# Patient Record
Sex: FEMALE | Race: BLACK OR AFRICAN AMERICAN | Employment: PART TIME | URBAN - METROPOLITAN AREA
[De-identification: names, ages, dates, MRNs, and addresses within clinical notes are randomized per-mention and may not be internally consistent; named-entity substitution may affect disease eponyms.]

---

## 2017-01-04 DIAGNOSIS — R51.9 ACUTE NONINTRACTABLE HEADACHE, UNSPECIFIED HEADACHE TYPE: ICD-10-CM

## 2017-01-04 DIAGNOSIS — L30.9 DERMATITIS: ICD-10-CM

## 2017-01-04 RX ORDER — LORATADINE 10 MG/1
10 TABLET ORAL DAILY
Qty: 30 TABLET | Refills: 1 | Status: SHIPPED | OUTPATIENT
Start: 2017-01-04 | End: 2017-05-18 | Stop reason: ALTCHOICE

## 2017-01-04 RX ORDER — NAPROXEN 500 MG/1
500 TABLET ORAL 2 TIMES DAILY PRN
Qty: 60 TABLET | Refills: 0 | Status: SHIPPED | OUTPATIENT
Start: 2017-01-04 | End: 2017-05-18 | Stop reason: ALTCHOICE

## 2017-04-06 ENCOUNTER — TELEPHONE (OUTPATIENT)
Dept: INTERNAL MEDICINE CLINIC | Age: 22
End: 2017-04-06

## 2017-05-18 ENCOUNTER — OFFICE VISIT (OUTPATIENT)
Dept: PRIMARY CARE CLINIC | Age: 22
End: 2017-05-18

## 2017-05-18 VITALS
TEMPERATURE: 98.7 F | HEART RATE: 97 BPM | DIASTOLIC BLOOD PRESSURE: 78 MMHG | HEIGHT: 63 IN | WEIGHT: 167.6 LBS | BODY MASS INDEX: 29.7 KG/M2 | SYSTOLIC BLOOD PRESSURE: 108 MMHG | OXYGEN SATURATION: 98 %

## 2017-05-18 DIAGNOSIS — Z22.7 TB LUNG, LATENT: ICD-10-CM

## 2017-05-18 DIAGNOSIS — J30.2 SEASONAL ALLERGIC RHINITIS, UNSPECIFIED ALLERGIC RHINITIS TRIGGER: ICD-10-CM

## 2017-05-18 DIAGNOSIS — N92.6 IRREGULAR PERIODS: ICD-10-CM

## 2017-05-18 DIAGNOSIS — Z00.00 PREVENTATIVE HEALTH CARE: ICD-10-CM

## 2017-05-18 DIAGNOSIS — Z00.00 PREVENTATIVE HEALTH CARE: Primary | ICD-10-CM

## 2017-05-18 LAB
ALBUMIN SERPL-MCNC: 4.3 G/DL (ref 3.4–5)
ALP BLD-CCNC: 57 U/L (ref 40–129)
ALT SERPL-CCNC: 12 U/L (ref 10–40)
ANION GAP SERPL CALCULATED.3IONS-SCNC: 16 MMOL/L (ref 3–16)
AST SERPL-CCNC: 14 U/L (ref 15–37)
BASOPHILS ABSOLUTE: 0 K/UL (ref 0–0.2)
BASOPHILS RELATIVE PERCENT: 0.4 %
BILIRUB SERPL-MCNC: 0.3 MG/DL (ref 0–1)
BILIRUBIN DIRECT: <0.2 MG/DL (ref 0–0.3)
BILIRUBIN, INDIRECT: ABNORMAL MG/DL (ref 0–1)
BUN BLDV-MCNC: 7 MG/DL (ref 7–20)
CALCIUM SERPL-MCNC: 9.1 MG/DL (ref 8.3–10.6)
CHLORIDE BLD-SCNC: 102 MMOL/L (ref 99–110)
CO2: 22 MMOL/L (ref 21–32)
CREAT SERPL-MCNC: 0.5 MG/DL (ref 0.6–1.1)
EOSINOPHILS ABSOLUTE: 0.1 K/UL (ref 0–0.6)
EOSINOPHILS RELATIVE PERCENT: 2.3 %
GFR AFRICAN AMERICAN: >60
GFR NON-AFRICAN AMERICAN: >60
GLUCOSE BLD-MCNC: 85 MG/DL (ref 70–99)
HCT VFR BLD CALC: 38 % (ref 36–48)
HEMOGLOBIN: 12.8 G/DL (ref 12–16)
HEPATITIS C ANTIBODY INTERPRETATION: NORMAL
LYMPHOCYTES ABSOLUTE: 1.7 K/UL (ref 1–5.1)
LYMPHOCYTES RELATIVE PERCENT: 30.4 %
MCH RBC QN AUTO: 29 PG (ref 26–34)
MCHC RBC AUTO-ENTMCNC: 33.7 G/DL (ref 31–36)
MCV RBC AUTO: 86 FL (ref 80–100)
MONOCYTES ABSOLUTE: 0.5 K/UL (ref 0–1.3)
MONOCYTES RELATIVE PERCENT: 9.5 %
NEUTROPHILS ABSOLUTE: 3.3 K/UL (ref 1.7–7.7)
NEUTROPHILS RELATIVE PERCENT: 57.4 %
PDW BLD-RTO: 12.6 % (ref 12.4–15.4)
PHOSPHORUS: 3.4 MG/DL (ref 2.5–4.9)
PLATELET # BLD: 223 K/UL (ref 135–450)
PMV BLD AUTO: 9.7 FL (ref 5–10.5)
POTASSIUM SERPL-SCNC: 4.2 MMOL/L (ref 3.5–5.1)
RBC # BLD: 4.42 M/UL (ref 4–5.2)
SODIUM BLD-SCNC: 140 MMOL/L (ref 136–145)
TOTAL PROTEIN: 6.8 G/DL (ref 6.4–8.2)
WBC # BLD: 5.7 K/UL (ref 4–11)

## 2017-05-18 PROCEDURE — 99395 PREV VISIT EST AGE 18-39: CPT | Performed by: INTERNAL MEDICINE

## 2017-05-18 RX ORDER — CETIRIZINE HYDROCHLORIDE 5 MG/1
5 TABLET ORAL DAILY
Qty: 30 TABLET | Refills: 3 | Status: SHIPPED | OUTPATIENT
Start: 2017-05-18 | End: 2017-06-30 | Stop reason: ALTCHOICE

## 2017-05-18 RX ORDER — FLUTICASONE PROPIONATE 50 MCG
1 SPRAY, SUSPENSION (ML) NASAL DAILY
Qty: 1 BOTTLE | Refills: 3 | Status: SHIPPED | OUTPATIENT
Start: 2017-05-18 | End: 2019-04-18 | Stop reason: SDUPTHER

## 2017-05-18 ASSESSMENT — ENCOUNTER SYMPTOMS
DIARRHEA: 0
NAUSEA: 1
SHORTNESS OF BREATH: 0
RHINORRHEA: 0
SORE THROAT: 0
VOMITING: 0
COUGH: 0
ABDOMINAL PAIN: 0

## 2017-05-19 LAB
ESTIMATED AVERAGE GLUCOSE: 105.4 MG/DL
HBA1C MFR BLD: 5.3 %

## 2017-05-20 LAB — HIV-1 AND HIV-2 ANTIBODIES: NEGATIVE

## 2017-05-26 ENCOUNTER — TELEPHONE (OUTPATIENT)
Dept: PRIMARY CARE CLINIC | Age: 22
End: 2017-05-26

## 2017-05-31 DIAGNOSIS — G56.01 RIGHT CARPAL TUNNEL SYNDROME: Primary | ICD-10-CM

## 2017-06-01 ENCOUNTER — OFFICE VISIT (OUTPATIENT)
Dept: ORTHOPEDIC SURGERY | Age: 22
End: 2017-06-01

## 2017-06-01 VITALS
BODY MASS INDEX: 28.51 KG/M2 | DIASTOLIC BLOOD PRESSURE: 63 MMHG | WEIGHT: 167 LBS | HEART RATE: 67 BPM | SYSTOLIC BLOOD PRESSURE: 103 MMHG | HEIGHT: 64 IN

## 2017-06-01 DIAGNOSIS — G56.03 CARPAL TUNNEL SYNDROME, BILATERAL: Primary | ICD-10-CM

## 2017-06-01 PROCEDURE — 99203 OFFICE O/P NEW LOW 30 MIN: CPT | Performed by: ORTHOPAEDIC SURGERY

## 2017-06-08 ENCOUNTER — TELEPHONE (OUTPATIENT)
Dept: PRIMARY CARE CLINIC | Age: 22
End: 2017-06-08

## 2017-06-29 ENCOUNTER — TELEPHONE (OUTPATIENT)
Dept: PRIMARY CARE CLINIC | Age: 22
End: 2017-06-29

## 2017-06-30 ENCOUNTER — OFFICE VISIT (OUTPATIENT)
Dept: PRIMARY CARE CLINIC | Age: 22
End: 2017-06-30

## 2017-06-30 VITALS
BODY MASS INDEX: 27.66 KG/M2 | HEIGHT: 64 IN | TEMPERATURE: 97.8 F | RESPIRATION RATE: 22 BRPM | OXYGEN SATURATION: 97 % | WEIGHT: 162 LBS | DIASTOLIC BLOOD PRESSURE: 67 MMHG | SYSTOLIC BLOOD PRESSURE: 104 MMHG | HEART RATE: 101 BPM

## 2017-06-30 DIAGNOSIS — J01.01 ACUTE RECURRENT MAXILLARY SINUSITIS: Primary | ICD-10-CM

## 2017-06-30 DIAGNOSIS — J30.1 SEASONAL ALLERGIC RHINITIS DUE TO POLLEN: ICD-10-CM

## 2017-06-30 PROCEDURE — 99213 OFFICE O/P EST LOW 20 MIN: CPT | Performed by: FAMILY MEDICINE

## 2017-06-30 RX ORDER — ACETAMINOPHEN 325 MG/1
650 TABLET ORAL EVERY 4 HOURS PRN
Qty: 120 TABLET | Refills: 3 | Status: SHIPPED | OUTPATIENT
Start: 2017-06-30 | End: 2019-04-30 | Stop reason: SDUPTHER

## 2017-06-30 RX ORDER — LORATADINE 10 MG/1
1 TABLET ORAL DAILY
COMMUNITY
Start: 2017-06-22 | End: 2019-04-18 | Stop reason: SDUPTHER

## 2017-06-30 RX ORDER — GUAIFENESIN 600 MG/1
600 TABLET, EXTENDED RELEASE ORAL 2 TIMES DAILY
Qty: 60 TABLET | Refills: 0 | Status: SHIPPED | OUTPATIENT
Start: 2017-06-30 | End: 2017-08-28

## 2017-06-30 RX ORDER — AZITHROMYCIN 250 MG/1
TABLET, FILM COATED ORAL
Qty: 1 PACKET | Refills: 0 | Status: SHIPPED | OUTPATIENT
Start: 2017-06-30 | End: 2017-07-10

## 2017-07-26 ENCOUNTER — OFFICE VISIT (OUTPATIENT)
Dept: PRIMARY CARE CLINIC | Age: 22
End: 2017-07-26

## 2017-07-26 VITALS
HEART RATE: 92 BPM | BODY MASS INDEX: 29.02 KG/M2 | OXYGEN SATURATION: 100 % | HEIGHT: 64 IN | RESPIRATION RATE: 18 BRPM | SYSTOLIC BLOOD PRESSURE: 129 MMHG | DIASTOLIC BLOOD PRESSURE: 75 MMHG | WEIGHT: 170 LBS | TEMPERATURE: 99.3 F

## 2017-07-26 DIAGNOSIS — R11.0 NAUSEA: ICD-10-CM

## 2017-07-26 DIAGNOSIS — N92.6 IRREGULAR PERIODS: ICD-10-CM

## 2017-07-26 DIAGNOSIS — R35.0 FREQUENCY OF URINATION: Primary | ICD-10-CM

## 2017-07-26 LAB
BILIRUBIN URINE: NEGATIVE
BILIRUBIN, POC: NORMAL
BLOOD URINE, POC: NORMAL
BLOOD, URINE: NEGATIVE
CLARITY, POC: NORMAL
CLARITY: CLEAR
COLOR, POC: YELLOW
COLOR: YELLOW
CONTROL: NORMAL
GLUCOSE BLD-MCNC: 82 MG/DL
GLUCOSE URINE, POC: NORMAL
GLUCOSE URINE: NEGATIVE MG/DL
KETONES, POC: NORMAL
KETONES, URINE: NEGATIVE MG/DL
LEUKOCYTE EST, POC: NORMAL
LEUKOCYTE ESTERASE, URINE: NEGATIVE
MICROSCOPIC EXAMINATION: NORMAL
NITRITE, POC: NORMAL
NITRITE, URINE: NEGATIVE
PH UA: 7
PH, POC: 7
PREGNANCY TEST URINE, POC: NORMAL
PROTEIN UA: NEGATIVE MG/DL
PROTEIN, POC: NORMAL
SPECIFIC GRAVITY UA: 1.01
SPECIFIC GRAVITY, POC: 1.01
URINE TYPE: NORMAL
UROBILINOGEN, POC: 0.2
UROBILINOGEN, URINE: 0.2 E.U./DL

## 2017-07-26 PROCEDURE — 81025 URINE PREGNANCY TEST: CPT | Performed by: FAMILY MEDICINE

## 2017-07-26 PROCEDURE — 81002 URINALYSIS NONAUTO W/O SCOPE: CPT | Performed by: FAMILY MEDICINE

## 2017-07-26 PROCEDURE — 82962 GLUCOSE BLOOD TEST: CPT | Performed by: FAMILY MEDICINE

## 2017-07-26 PROCEDURE — 99214 OFFICE O/P EST MOD 30 MIN: CPT | Performed by: FAMILY MEDICINE

## 2017-07-26 RX ORDER — CIPROFLOXACIN 500 MG/1
TABLET, FILM COATED ORAL
Qty: 6 TABLET | Refills: 0 | Status: SHIPPED | OUTPATIENT
Start: 2017-07-26 | End: 2017-08-28

## 2017-07-26 ASSESSMENT — ENCOUNTER SYMPTOMS
DIARRHEA: 0
TROUBLE SWALLOWING: 0
PHOTOPHOBIA: 0
RHINORRHEA: 0
EYE DISCHARGE: 0
EYE ITCHING: 0
CONSTIPATION: 0
SORE THROAT: 0
VOMITING: 0
NAUSEA: 0
RECTAL PAIN: 0
APNEA: 0
COUGH: 0
BACK PAIN: 0
SINUS PRESSURE: 0
STRIDOR: 0
ABDOMINAL DISTENTION: 0
BLOOD IN STOOL: 0
EYE PAIN: 0
EYE REDNESS: 0
CHEST TIGHTNESS: 0
SHORTNESS OF BREATH: 0
WHEEZING: 0
CHOKING: 0
COLOR CHANGE: 0

## 2017-07-28 ENCOUNTER — TELEPHONE (OUTPATIENT)
Dept: PRIMARY CARE CLINIC | Age: 22
End: 2017-07-28

## 2017-08-16 ENCOUNTER — HOSPITAL ENCOUNTER (OUTPATIENT)
Dept: NEUROLOGY | Age: 22
Discharge: HOME | End: 2017-08-16
Attending: ORTHOPAEDIC SURGERY | Admitting: ORTHOPAEDIC SURGERY

## 2017-08-16 DIAGNOSIS — G56.03 BILATERAL CARPAL TUNNEL SYNDROME: ICD-10-CM

## 2017-08-18 ENCOUNTER — TELEPHONE (OUTPATIENT)
Dept: ORTHOPEDIC SURGERY | Age: 22
End: 2017-08-18

## 2017-08-23 ENCOUNTER — TELEPHONE (OUTPATIENT)
Dept: INTERNAL MEDICINE CLINIC | Age: 22
End: 2017-08-23

## 2017-08-28 ENCOUNTER — OFFICE VISIT (OUTPATIENT)
Dept: PRIMARY CARE CLINIC | Age: 22
End: 2017-08-28

## 2017-08-28 VITALS
SYSTOLIC BLOOD PRESSURE: 114 MMHG | BODY MASS INDEX: 28.51 KG/M2 | RESPIRATION RATE: 18 BRPM | TEMPERATURE: 98.2 F | HEIGHT: 64 IN | HEART RATE: 92 BPM | OXYGEN SATURATION: 100 % | WEIGHT: 167 LBS | DIASTOLIC BLOOD PRESSURE: 67 MMHG

## 2017-08-28 DIAGNOSIS — Z22.7 TB LUNG, LATENT: ICD-10-CM

## 2017-08-28 DIAGNOSIS — Z00.00 PREVENTATIVE HEALTH CARE: ICD-10-CM

## 2017-08-28 DIAGNOSIS — Z00.00 PREVENTATIVE HEALTH CARE: Primary | ICD-10-CM

## 2017-08-28 LAB
ALBUMIN SERPL-MCNC: 4.8 G/DL (ref 3.4–5)
ANION GAP SERPL CALCULATED.3IONS-SCNC: 12 MMOL/L (ref 3–16)
BASOPHILS ABSOLUTE: 0 K/UL (ref 0–0.2)
BASOPHILS RELATIVE PERCENT: 0.4 %
BUN BLDV-MCNC: 11 MG/DL (ref 7–20)
CALCIUM SERPL-MCNC: 10 MG/DL (ref 8.3–10.6)
CHLORIDE BLD-SCNC: 105 MMOL/L (ref 99–110)
CO2: 25 MMOL/L (ref 21–32)
CREAT SERPL-MCNC: 0.5 MG/DL (ref 0.6–1.1)
EOSINOPHILS ABSOLUTE: 0.1 K/UL (ref 0–0.6)
EOSINOPHILS RELATIVE PERCENT: 1.9 %
GFR AFRICAN AMERICAN: >60
GFR NON-AFRICAN AMERICAN: >60
GLUCOSE BLD-MCNC: 77 MG/DL (ref 70–99)
HCT VFR BLD CALC: 39.7 % (ref 36–48)
HEMOGLOBIN: 13.3 G/DL (ref 12–16)
LYMPHOCYTES ABSOLUTE: 1.5 K/UL (ref 1–5.1)
LYMPHOCYTES RELATIVE PERCENT: 27.5 %
MCH RBC QN AUTO: 28.5 PG (ref 26–34)
MCHC RBC AUTO-ENTMCNC: 33.5 G/DL (ref 31–36)
MCV RBC AUTO: 84.9 FL (ref 80–100)
MONOCYTES ABSOLUTE: 0.6 K/UL (ref 0–1.3)
MONOCYTES RELATIVE PERCENT: 11.2 %
NEUTROPHILS ABSOLUTE: 3.3 K/UL (ref 1.7–7.7)
NEUTROPHILS RELATIVE PERCENT: 59 %
PDW BLD-RTO: 13.4 % (ref 12.4–15.4)
PHOSPHORUS: 3.3 MG/DL (ref 2.5–4.9)
PLATELET # BLD: 231 K/UL (ref 135–450)
PMV BLD AUTO: 9.7 FL (ref 5–10.5)
POTASSIUM SERPL-SCNC: 4.2 MMOL/L (ref 3.5–5.1)
RBC # BLD: 4.67 M/UL (ref 4–5.2)
SODIUM BLD-SCNC: 142 MMOL/L (ref 136–145)
WBC # BLD: 5.6 K/UL (ref 4–11)

## 2017-08-28 PROCEDURE — 99395 PREV VISIT EST AGE 18-39: CPT | Performed by: INTERNAL MEDICINE

## 2017-08-28 ASSESSMENT — ENCOUNTER SYMPTOMS
COUGH: 0
ABDOMINAL PAIN: 0
DIARRHEA: 0
SHORTNESS OF BREATH: 0
VOMITING: 0
NAUSEA: 0

## 2017-08-28 ASSESSMENT — PATIENT HEALTH QUESTIONNAIRE - PHQ9
SUM OF ALL RESPONSES TO PHQ QUESTIONS 1-9: 0
SUM OF ALL RESPONSES TO PHQ9 QUESTIONS 1 & 2: 0
2. FEELING DOWN, DEPRESSED OR HOPELESS: 0

## 2017-08-30 ENCOUNTER — TELEPHONE (OUTPATIENT)
Dept: PRIMARY CARE CLINIC | Age: 22
End: 2017-08-30

## 2017-08-31 LAB
QUANTIFERON (R) TB GOLD (INCUBATED): NEGATIVE
QUANTIFERON MITOGEN: >10 IU/ML
QUANTIFERON NIL: 0.24 IU/ML
QUANTIFERON TB AG MINUS NIL: 0 IU/ML (ref 0–0.34)

## 2017-09-11 ENCOUNTER — TELEPHONE (OUTPATIENT)
Dept: PRIMARY CARE CLINIC | Age: 22
End: 2017-09-11

## 2017-09-25 ENCOUNTER — TELEPHONE (OUTPATIENT)
Dept: ORTHOPEDIC SURGERY | Age: 22
End: 2017-09-25

## 2017-10-06 ENCOUNTER — OFFICE VISIT (OUTPATIENT)
Dept: PRIMARY CARE CLINIC | Age: 22
End: 2017-10-06

## 2017-10-06 VITALS
HEART RATE: 86 BPM | BODY MASS INDEX: 30.16 KG/M2 | WEIGHT: 173 LBS | SYSTOLIC BLOOD PRESSURE: 111 MMHG | DIASTOLIC BLOOD PRESSURE: 64 MMHG

## 2017-10-06 DIAGNOSIS — R22.9: ICD-10-CM
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
## 2017-10-06 DIAGNOSIS — L29.9 ITCHING: Primary | ICD-10-CM

## 2017-10-06 PROBLEM — S80.869A FLEA BITE OF LOWER LEG: Status: ACTIVE | Noted: 2017-10-06

## 2017-10-06 PROCEDURE — 99213 OFFICE O/P EST LOW 20 MIN: CPT | Performed by: INTERNAL MEDICINE

## 2017-10-06 RX ORDER — HYDROXYZINE HYDROCHLORIDE 25 MG/1
25 TABLET, FILM COATED ORAL 3 TIMES DAILY PRN
Qty: 30 TABLET | Refills: 1 | Status: SHIPPED | OUTPATIENT
Start: 2017-10-06 | End: 2017-10-16

## 2017-10-06 RX ORDER — ASPIRIN 81 MG/1
81 TABLET, CHEWABLE ORAL
COMMUNITY
Start: 2017-09-14 | End: 2019-04-30 | Stop reason: ALTCHOICE

## 2017-10-06 RX ORDER — PRAMOXINE HYDROCHLORIDE 10 MG/ML
1 LOTION TOPICAL 3 TIMES DAILY PRN
Qty: 222 ML | Refills: 1 | Status: SHIPPED | OUTPATIENT
Start: 2017-10-06 | End: 2019-04-30 | Stop reason: ALTCHOICE

## 2017-10-06 ASSESSMENT — ENCOUNTER SYMPTOMS
RESPIRATORY NEGATIVE: 1
GASTROINTESTINAL NEGATIVE: 1
SWOLLEN GLANDS: 0
NAUSEA: 0
EYES NEGATIVE: 1
SORE THROAT: 0
VISUAL CHANGE: 0
CHANGE IN BOWEL HABIT: 0
VOMITING: 0
COUGH: 0
ABDOMINAL PAIN: 0

## 2017-10-06 NOTE — PROGRESS NOTES
Carmen Lemos  YOB: 1995    Date of Service:  10/6/2017    Chief Complaint   Patient presents with    Other     rash on ankles for 4-5 days, itching. possibly flee bites    Health Maintenance     flu vacccine at [facility]      Flea bits on lower legs with constant itching for 5 days. Other   Chronicity: flea bites. The current episode started in the past 7 days. The problem occurs constantly. The problem has been unchanged. Pertinent negatives include no abdominal pain, anorexia, arthralgias, change in bowel habit, chest pain, chills, congestion, coughing, diaphoresis, fatigue, fever, headaches, joint swelling, myalgias, nausea, neck pain, numbness, rash, sore throat, swollen glands, urinary symptoms, visual change, vomiting or weakness. Nothing aggravates the symptoms. She has tried nothing for the symptoms. The treatment provided no relief.        Allergies   Allergen Reactions    Nickel      Topical       Outpatient Prescriptions Marked as Taking for the 10/6/17 encounter (Office Visit) with Keaton Dupont MD   Medication Sig Dispense Refill    aspirin 81 MG chewable tablet Take 81 mg by mouth      IRON PO Take 1 tablet by mouth      Pramoxine HCl 1 % LOTN Apply 1 actuation topically 3 times daily as needed (itching from fleas) 222 mL 1    hydrOXYzine (ATARAX) 25 MG tablet Take 1 tablet by mouth 3 times daily as needed for Itching 30 tablet 1    acetaminophen (AMINOFEN) 325 MG tablet Take 2 tablets by mouth every 4 hours as needed for Pain 120 tablet 3       Immunization History   Administered Date(s) Administered    DTaP [birth year], 02/12/1996    HPV Gardasil Quadrivalent 01/21/2011, 03/21/2011, 12/19/2011, 02/20/2012    Hepatitis B, unspecified formulation 1995    Hib, unspecified foumulation 1995, 1995, 02/12/1996    IPV (Ipol) 1995, 1995    Influenza Virus Vaccine 09/20/2011, 09/25/2013, 10/30/2014, 10/05/2015, 09/14/2017    Influenza, Quadv, 3 yrs and older, IM, Preservative Free 2016    MMR 1996    Meningococcal MCV4P (Menactra) 2012    PPD Test 2015, 2015, 10/03/2016    Tdap (Boostrix, Adacel) 2012, 2015    Varicella (Varivax) 2008       Past Medical History:   Diagnosis Date    Counseling on other sexually transmitted diseases 2012    Elevated LFTs 13    viral negative    Mid-back pain, acute 2014    Otitis media, chronic, bilateral 2012    PIH (pregnancy induced hypertension) 2014    Positive TB test     negative chest XR    Preeclampsia 2014    post partum     Past Surgical History:   Procedure Laterality Date    ADENOIDECTOMY       SECTION      DENTAL SURGERY      root canal    TONSILLECTOMY       Family History   Problem Relation Age of Onset    High Cholesterol Mother     Depression Mother     Arthritis Mother     Asthma Sister     Early Death Sister     Heart Disease Sister     Depression Maternal Aunt     Arthritis Maternal Grandmother     High Blood Pressure Maternal Grandmother     Kidney Disease Maternal Grandmother     High Blood Pressure Maternal Grandfather     Vision Loss Maternal Grandfather     Rheum Arthritis Neg Hx     Osteoarthritis Neg Hx     Breast Cancer Neg Hx     Cancer Neg Hx     Diabetes Neg Hx     Heart Failure Neg Hx     Hypertension Neg Hx     Migraines Neg Hx     Ovarian Cancer Neg Hx     Rashes/Skin Problems Neg Hx     Seizures Neg Hx     Stroke Neg Hx     Thyroid Disease Neg Hx     Other Neg Hx      VTE       Review of Systems:  Review of Systems   Constitutional: Negative. Negative for chills, diaphoresis, fatigue and fever. HENT: Negative. Negative for congestion and sore throat. Eyes: Negative. Respiratory: Negative. Negative for cough. Cardiovascular: Negative. Negative for chest pain. Gastrointestinal: Negative.   Negative for abdominal pain, anorexia, 05/25/2017 see below     WBC, UA 05/25/2017 99*    RBC, UA 05/25/2017 8*    Epi Cells 05/25/2017 2    Orders Only on 05/18/2017   Component Date Value    WBC 05/18/2017 5.7     RBC 05/18/2017 4.42     Hemoglobin 05/18/2017 12.8     Hematocrit 05/18/2017 38.0     MCV 05/18/2017 86.0     MCH 05/18/2017 29.0     MCHC 05/18/2017 33.7     RDW 05/18/2017 12.6     Platelets 83/23/4104 223     MPV 05/18/2017 9.7     Neutrophils % 05/18/2017 57.4     Lymphocytes % 05/18/2017 30.4     Monocytes % 05/18/2017 9.5     Eosinophils % 05/18/2017 2.3     Basophils % 05/18/2017 0.4     Neutrophils # 05/18/2017 3.3     Lymphocytes # 05/18/2017 1.7     Monocytes # 05/18/2017 0.5     Eosinophils # 05/18/2017 0.1     Basophils # 05/18/2017 0.0     Hemoglobin A1C 05/18/2017 5.3     eAG 05/18/2017 105.4     Total Protein 05/18/2017 6.8     Alb 05/18/2017 4.3     Alkaline Phosphatase 05/18/2017 57     ALT 05/18/2017 12     AST 05/18/2017 14*    Total Bilirubin 05/18/2017 0.3     Bilirubin, Direct 05/18/2017 <0.2     Bilirubin, Indirect 05/18/2017 see below     HIV-1/HIV-2 Ab 05/18/2017 Negative     Sodium 05/18/2017 140     Potassium 05/18/2017 4.2     Chloride 05/18/2017 102     CO2 05/18/2017 22     Anion Gap 05/18/2017 16     Glucose 05/18/2017 85     BUN 05/18/2017 7     CREATININE 05/18/2017 0.5*    GFR Non- 05/18/2017 >60     GFR  05/18/2017 >60     Calcium 05/18/2017 9.1     Phosphorus 05/18/2017 3.4     Hep C Ab Interp 05/18/2017 Non-reactive          Health Maintenance   Topic Date Due    Chlamydia screen  02/22/2017    Cervical cancer screen  02/22/2019    DTaP/Tdap/Td vaccine (6 - Td) 01/20/2025    Meningococcal (MCV) Vaccine Age 0-22 Years  Completed    Flu vaccine  Completed    HIV screen  Completed          Assessment/Plan:    Flea bite of lower leg  Flea bites on lower legs with pruritis.       1. Insect bite, initial encounter    - Pramoxine HCl 1 % LOTN; Apply 1 actuation topically 3 times daily as needed (itching from fleas)  Dispense: 222 mL; Refill: 1  - hydrOXYzine (ATARAX) 25 MG tablet; Take 1 tablet by mouth 3 times daily as needed for Itching  Dispense: 30 tablet; Refill: 1       No Follow-up on file.

## 2017-10-06 NOTE — PATIENT INSTRUCTIONS
Use the topical lotion and the Atarax pills as directed for relief of the itching and skin irritation from the flea bites. See Dr. Natalya Werner in 2 weeks. Tick Bite: Care Instructions  Your Care Instructions    Ticks are small spiderlike animals. They bite to fasten themselves onto your skin and feed on your blood. Ticks can carry diseases. But most ticks do not carry diseases, and most tick bites do not cause serious health problems. Some people may have an allergic reaction to a tick bite. This reaction may be mild, with symptoms like itching and swelling. In rare cases, a severe allergic reaction may occur. Most of the time, all you need to do for a tick bite is relieve any symptoms you may have. Follow-up care is a key part of your treatment and safety. Be sure to make and go to all appointments, and call your doctor if you are having problems. It's also a good idea to know your test results and keep a list of the medicines you take. How can you care for yourself at home? · Put ice or a cold pack on the bite for 15 to 20 minutes once an hour. Put a thin cloth between the ice and your skin. · Try an over-the-counter medicine to relieve itching, redness, swelling, and pain. Be safe with medicines. Read and follow all instructions on the label. ¨ Take an antihistamine medicine, such as chlorpheniramine (Chlor-Trimeton) or diphenhydramine (Benadryl). These medicines may help relieve itching, redness, and swelling. ¨ Use a spray of local anesthetic that contains benzocaine, such as Solarcaine. It may help relieve pain. If your skin reacts to the spray, stop using it. ¨ Put calamine lotion on the skin. It may help relieve itching. To avoid tick bites  · Avoid ticks:  ¨ Learn where ticks are found in your community, and stay away from those areas if possible. ¨ Cover as much of your body as possible when you work or play in grassy or wooded areas. ¨ Use insect repellents, such as products containing DEET. infection, such as:  ¨ Increased pain, swelling, warmth, or redness around the bite. ¨ Red streaks leading from the bite. ¨ Pus draining from the bite. ¨ A fever. Watch closely for changes in your health, and be sure to contact your doctor if:  · You develop a new rash. · You have joint pain. · You are very tired. · You have flu-like symptoms. · You have symptoms for more than 1 week. Where can you learn more? Go to https://[employer]. org and sign in to your [employer] account. Enter I188 in the [employer] box to learn more about \"Tick Bite: Care Instructions. \"     If you do not have an account, please click on the \"Sign Up Now\" link. Current as of: March 20, 2017  Content Version: 11.3  © 6789-8189 [employer]. Care instructions adapted under license by [facility] ([facility]). If you have questions about a medical condition or this instruction, always ask your healthcare professional. [place] [address] any warranty or liability for your use of this information. pramoxine topical  Pronunciation:  pra MOX een TOP i aide  Brand:  Anest Hemor, Blistex Pro Relief, Calaclear, Caladryl Clear (obsolete), Callergy Clear, Curasore, Gold Bond Anti-Itch, Itch-X, PrameGel, Pramox, Prax, Prax Wipe, Proctofoam, Proctozone-P, Sarna Sensitive, Sarna Ultra, Summers Мария Anti-Itch, Tronolane  What is the most important information I should know about pramoxine topical?  Use exactly as directed on the label, or as prescribed by your doctor. Do not use in larger or smaller amounts or for longer than recommended. Get emergency medical help if you have any of these signs of an allergic reaction: hives; difficult breathing; swelling of your face, lips, tongue, or throat. Less serious side effects are more likely, and you may have none at all. What is pramoxine topical?  Pramoxine is an anesthetic, or \"numbing medicine. \" It works by interfering with pain signals sent from the nerves to the brain. Pramoxine topical (for the skin) is used to treat pain or itching caused by insect bites, minor burns or scrapes, hemorrhoids, and minor skin rash, dryness, or itching. Pramoxine topical is also used to treat chapped lips, and pain or skin irritation caused by coming into contact with poison ivy, poison oak, or poison sumac. Pramoxine topical may also be used for purposes not listed in this medication guide. What should I discuss with my health care provider before using pramoxine topical?  You should not use this medication if you are allergic to pramoxine. Ask a doctor or pharmacist if it is safe for you to take this medicine if you are allergic to any drugs or any other numbing medicines. FDA pregnancy category C. It is not known whether pramoxine topical will harm an unborn baby. Do not use this medication without medical advice if you are pregnant. It is not known whether pramoxine topical passes into breast milk or if it could harm a nursing baby. Do not use this medication without medical advice if you are breast-feeding a baby. How should I use pramoxine topical?  Use exactly as directed on the label, or as prescribed by your doctor. Do not use in larger or smaller amounts or for longer than recommended. Pramoxine is usually applied to the affected area 3 to 5 times daily, depending on which form of this medication you use. Follow the label directions or your doctor's instructions about how much medication to use and how often. Pramoxine hemorrhoid cream, lotion, foam, or medicated wipe may be used on the rectum after each bowel movement to treat hemorrhoid pain and itching. Wash your hands before and after applying pramoxine topical. Wash the affected skin area with warm water and a mild soap. Rinse and dry the area thoroughly. To use pramoxine on the skin, (spray, lotion, gel, or stick), apply just enough of the medication to cover the area to be treated.   To use the pramoxine medicated wipe to treat the hemorrhoid area, apply the medication by patting the wipe onto the rectal area. Avoid harsh rubbing. You may fold the wipe and leave it in place for up to 15 minutes. Each pramoxine medicated wipe is for one use only. Throw the wipe away after using. Shake the pramoxine rectal foam before each use. Squirt only a small amount of the medicine onto a clean tissue and apply it to your rectum. Do not insert this medication or the medicated wipe into your rectum. Use pramoxine topical only on the outside of the area. Stop using pramoxine and call your doctor if your symptoms do not improve after 7 days of treatment, or if your condition clears up and then comes back. Store at room temperature away from moisture and heat. What happens if I miss a dose? Since pramoxine topical is used on an as needed basis, you are not likely to miss a dose. What happens if I overdose? Seek emergency medical attention or call the Poison Help line at 1-809.730.3283. What should I avoid while using pramoxine topical?  Avoid getting this medication in your eyes or nose. If this does happen, rinse with water. Do not use pramoxine topical on deep skin wounds, blistered skin, severe burns, or large skin areas. Seek medical attention for more severe skin irritation or injury. Avoid using other medications on the areas you treat with pramoxine topical unless your doctor tells you to. What are the possible side effects of pramoxine topical?  Get emergency medical help if you have any of these signs of an allergic reaction:  hives; difficulty breathing; swelling of your face, lips, tongue, or throat. Stop using pramoxine topical and call your doctor at once if you have a serious side effect such as:  · any new redness or swelling where the medicine was applied; or  · severe pain, burning, or stinging where the medicine is applied.   Less serious side effects are more likely, and you may have none at

## 2017-10-06 NOTE — MR AVS SNAPSHOT
After Visit Summary             Modesta Wright   10/6/2017 9:45 AM   Office Visit    Description:  Female : 1995   Provider:  Dalia Belcher MD   Department:  [address] [facility] [facility]              Your Follow-Up and Future Appointments         Below is a list of your follow-up and future appointments. This may not be a complete list as you may have made appointments directly with providers that we are not aware of or your providers may have made some for you. Please call your providers to confirm appointments. It is important to keep your appointments. Please bring your current insurance card, photo ID, co-pay, and all medication bottles to your appointment. If self-pay, payment is expected at the time of service. Information from Your Visit        Department     Name Address Phone Fax    [address] [facility] Anatoliy Irineo Chaim [address] [phone]      You Were Seen for:         Comments    Insect bite, initial encounter   [[id number]]         Vital Signs     Blood Pressure Pulse Weight Last Menstrual Period Body Mass Index Smoking Status    111/64 86 173 lb (78.5 kg) 2017 30.16 kg/m2 Never Smoker      Additional Information about your Body Mass Index (BMI)           Your BMI as listed above is considered obese (30 or more). BMI is an estimate of body fat, calculated from your height and weight. The higher your BMI, the greater your risk of heart disease, high blood pressure, type 2 diabetes, stroke, gallstones, arthritis, sleep apnea, and certain cancers. BMI is not perfect. It may overestimate body fat in athletes and people who are more muscular. Even a small weight loss (between 5 and 10 percent of your current weight) by decreasing your calorie intake and becoming more physically active will help lower your risk of developing or worsening diseases associated with obesity. Learn more at: [employer]uk          Instructions    Use the topical lotion and the Atarax pills as directed for relief of the itching and skin irritation from the flea bites. See Dr. Jose Choudhary in 2 weeks. Tick Bite: Care Instructions  Your Care Instructions    Ticks are small spiderlike animals. They bite to fasten themselves onto your skin and feed on your blood. Ticks can carry diseases. But most ticks do not carry diseases, and most tick bites do not cause serious health problems. Some people may have an allergic reaction to a tick bite. This reaction may be mild, with symptoms like itching and swelling. In rare cases, a severe allergic reaction may occur. Most of the time, all you need to do for a tick bite is relieve any symptoms you may have. Follow-up care is a key part of your treatment and safety. Be sure to make and go to all appointments, and call your doctor if you are having problems. It's also a good idea to know your test results and keep a list of the medicines you take. How can you care for yourself at home? · Put ice or a cold pack on the bite for 15 to 20 minutes once an hour. Put a thin cloth between the ice and your skin. · Try an over-the-counter medicine to relieve itching, redness, swelling, and pain. Be safe with medicines. Read and follow all instructions on the label. ¨ Take an antihistamine medicine, such as chlorpheniramine (Chlor-Trimeton) or diphenhydramine (Benadryl). These medicines may help relieve itching, redness, and swelling. ¨ Use a spray of local anesthetic that contains benzocaine, such as Solarcaine. It may help relieve pain. If your skin reacts to the spray, stop using it. ¨ Put calamine lotion on the skin. It may help relieve itching. To avoid tick bites  · Avoid ticks:  ¨ Learn where ticks are found in your community, and stay away from those areas if possible. ¨ Cover as much of your body as possible when you work or play in grassy or wooded areas. ¨ Use insect repellents, such as products containing DEET. You can spray them on your skin. ¨ Take steps to control ticks on your property if you live in an area where Lyme disease occurs. Clear leaves, brush, tall grasses, woodpiles, and stone fences from around your house and the edges of your yard or garden. This may help get rid of ticks. · When you come in from outdoors, check your body for ticks, including your groin, head, and underarms. The ticks may be about the size of a sesame seed. If no one else can help you check for ticks on your scalp, comb your hair with a fine-tooth comb. · If you find a tick, remove it quickly. Use tweezers to grasp the tick as close to its mouth (the part in your skin) as possible. Slowly pull the tick straight outdo not twist or yankuntil its mouth releases from your skin. · Ticks can come into your house on clothing, outdoor gear, and pets. These ticks can fall off and attach to you. ¨ Check your clothing and outdoor gear. Remove any ticks you find. Then put your clothing in a clothes dryer on high heat for 1 hour to kill any ticks that might remain. ¨ Check your pets for ticks after they have been outdoors. · When hiking in the woods, carry a small dry jar or ziplock bag. If you find a tick on your body, remove the tick and put it in the jar or bag. Store the container in the freezer so you can give it to your doctor if symptoms develop. The tick can be tested to learn whether it is carrying the bacteria that cause Lyme disease. When should you call for help? Call 911 anytime you think you may need emergency care. For example, call if:  · You have symptoms of a severe allergic reaction. These may include:  ¨ Sudden raised, red areas (hives) all over your body. ¨ Swelling of the throat, mouth, lips, or tongue. ¨ Trouble breathing. ¨ Passing out (losing consciousness). Or you may feel very lightheaded or suddenly feel weak, confused, or restless. Call your doctor now or seek immediate medical care if:  · You have signs of infection, such as:  ¨ Increased pain, swelling, warmth, or redness around the bite. ¨ Red streaks leading from the bite. ¨ Pus draining from the bite. ¨ A fever. Watch closely for changes in your health, and be sure to contact your doctor if:  · You develop a new rash. · You have joint pain. · You are very tired. · You have flu-like symptoms. · You have symptoms for more than 1 week. Where can you learn more? Go to https://[employer]. org and sign in to your [employer] account. Enter V349 in the [employer] box to learn more about \"Tick Bite: Care Instructions. \"     If you do not have an account, please click on the \"Sign Up Now\" link. Current as of: March 20, 2017  Content Version: 11.3  © 8219-7377 [employer]. Care instructions adapted under license by [facility] ([facility]). If you have questions about a medical condition or this instruction, always ask your healthcare professional. [place] [address] any warranty or liability for your use of this information. pramoxine topical  Pronunciation:  pra MOX een TOP i aide  Brand:  Anest Hemor, Blistex Pro Relief, Calaclear, Caladryl Clear (obsolete), Callergy Clear, Curasore, Gold Bond Anti-Itch, Itch-X, PrameGel, Pramox, Prax, Prax Wipe, Proctofoam, Proctozone-P, Sarna Sensitive, Sarna Ultra, Summers Мария Anti-Itch, Tronolane  What is the most important information I should know about pramoxine topical?  Use exactly as directed on the label, or as prescribed by your doctor. Do not use in larger or smaller amounts or for longer than recommended. Get emergency medical help if you have any of these signs of an allergic reaction: hives; difficult breathing; swelling of your face, lips, tongue, or throat. Less serious side effects are more likely, and you may have none at all. What is pramoxine topical?  Pramoxine is an anesthetic, or \"numbing medicine. \" It works by interfering with pain signals sent from the nerves to the brain. Pramoxine topical (for the skin) is used to treat pain or itching caused by insect bites, minor burns or scrapes, hemorrhoids, and minor skin rash, dryness, or itching. Pramoxine topical is also used to treat chapped lips, and pain or skin irritation caused by coming into contact with poison ivy, poison oak, or poison sumac. Pramoxine topical may also be used for purposes not listed in this medication guide. What should I discuss with my health care provider before using pramoxine topical?  You should not use this medication if you are allergic to pramoxine. Ask a doctor or pharmacist if it is safe for you to take this medicine if you are allergic to any drugs or any other numbing medicines. FDA pregnancy category C. It is not known whether pramoxine topical will harm an unborn baby. Do not use this medication without medical advice if you are pregnant. It is not known whether pramoxine topical passes into breast milk or if it could harm a nursing baby. Do not use this medication without medical advice if you are breast-feeding a baby. How should I use pramoxine topical?  Use exactly as directed on the label, or as prescribed by your doctor. Do not use in larger or smaller amounts or for longer than recommended. Pramoxine is usually applied to the affected area 3 to 5 times daily, depending on which form of this medication you use. Follow the label directions or your doctor's instructions about how much medication to use and how often. Pramoxine hemorrhoid cream, lotion, foam, or medicated wipe may be used on the rectum after each bowel movement to treat hemorrhoid pain and itching.   Wash your hands before and after applying pramoxine topical. Wash the affected skin area with warm water and a mild soap. Rinse and dry the area thoroughly. To use pramoxine on the skin, (spray, lotion, gel, or stick), apply just enough of the medication to cover the area to be treated. To use the pramoxine medicated wipe to treat the hemorrhoid area, apply the medication by patting the wipe onto the rectal area. Avoid harsh rubbing. You may fold the wipe and leave it in place for up to 15 minutes. Each pramoxine medicated wipe is for one use only. Throw the wipe away after using. Shake the pramoxine rectal foam before each use. Squirt only a small amount of the medicine onto a clean tissue and apply it to your rectum. Do not insert this medication or the medicated wipe into your rectum. Use pramoxine topical only on the outside of the area. Stop using pramoxine and call your doctor if your symptoms do not improve after 7 days of treatment, or if your condition clears up and then comes back. Store at room temperature away from moisture and heat. What happens if I miss a dose? Since pramoxine topical is used on an as needed basis, you are not likely to miss a dose. What happens if I overdose? Seek emergency medical attention or call the Poison Help line at 1-464.383.7891. What should I avoid while using pramoxine topical?  Avoid getting this medication in your eyes or nose. If this does happen, rinse with water. Do not use pramoxine topical on deep skin wounds, blistered skin, severe burns, or large skin areas. Seek medical attention for more severe skin irritation or injury. Avoid using other medications on the areas you treat with pramoxine topical unless your doctor tells you to. What are the possible side effects of pramoxine topical?  Get emergency medical help if you have any of these signs of an allergic reaction:  hives; difficulty breathing; swelling of your face, lips, tongue, or throat.   Stop using pramoxine topical and call your doctor at once if you have a a substitute for, the expertise, skill, knowledge and judgment of healthcare practitioners. The absence of a warning for a given drug or drug combination in no way should be construed to indicate that the drug or drug combination is safe, effective or appropriate for any given patient. [facility] does not assume any responsibility for any aspect of healthcare administered with the aid of information [facility] provides. The information contained herein is not intended to cover all possible uses, directions, precautions, warnings, drug interactions, allergic reactions, or adverse effects. If you have questions about the drugs you are taking, check with your doctor, nurse or pharmacist.  Copyright 3242-3292 [address]: 2.02. Revision date: 5/4/2012. Care instructions adapted under license by [facility] ([facility]). If you have questions about a medical condition or this instruction, always ask your healthcare professional. [place] [address] any warranty or liability for your use of this information. Today's Medication Changes          These changes are accurate as of: 10/6/17 10:17 AM.  If you have any questions, ask your nurse or doctor. START taking these medications           hydrOXYzine 25 MG tablet   Commonly known as:  ATARAX   Instructions:   Take 1 tablet by mouth 3 times daily as needed for Itching   Quantity:  30 tablet   Refills:  1   Started by:  Brigitte Pinzon MD       Pramoxine HCl 1 % Lotn   Instructions:  Apply 1 actuation topically 3 times daily as needed (itching from fleas)   Quantity:  222 mL   Refills:  1   Started by:  Brigitte Pinzon MD            Where to Get Your Medications      These medications were sent to [address], [address] [place] [address] [phone]  [address] [place] [address]     Phone:  [phone]     hydrOXYzine 25 MG tablet    Pramoxine HCl 1 % Lotn Your Current Medications Are              aspirin 81 MG chewable tablet Take 81 mg by mouth    IRON PO Take 1 tablet by mouth    Pramoxine HCl 1 % LOTN Apply 1 actuation topically 3 times daily as needed (itching from fleas)    hydrOXYzine (ATARAX) 25 MG tablet Take 1 tablet by mouth 3 times daily as needed for Itching    acetaminophen (AMINOFEN) 325 MG tablet Take 2 tablets by mouth every 4 hours as needed for Pain    loratadine (CLARITIN) 10 MG tablet Take 1 tablet by mouth daily    fluticasone (FLONASE) 50 MCG/ACT nasal spray 1 spray by Nasal route daily      Allergies              Nickel     Topical         Additional Information        Basic Information     Date Of Birth Sex Race Ethnicity Preferred Language    1995 Female Black Non-/Non  English      Problem List as of 10/6/2017  Date Reviewed: 8/28/2017                Insect bite    Carpal tunnel syndrome, bilateral    PPD positive    Irritable bowel syndrome with diarrhea    Gastroesophageal reflux disease    Allergic rhinitis    History of eclampsia      Immunizations as of 10/6/2017     Name Date    DTaP Vaccine 2/12/1996, 1995, 1995    HPV Gardasil Quadrivalent 2/20/2012, 12/19/2011, 3/21/2011, 1/21/2011    Hepatitis B 1995    Hib, unspecified foumulation 2/12/1996, 1995, 1995    IPV (Ipol) 1995, 1995    Influenza Virus Vaccine 10/5/2015, 10/30/2014, 9/25/2013, 9/20/2011    Influenza, Sarah Delvalle, 3 yrs and older, IM, Preservative Free 11/3/2016    MMR 2/12/1996    Meningococcal MCV4P (Menactra) 11/20/2012    PPD Test 10/3/2016, 9/29/2015, 3/9/2015    Tdap (Boostrix, Adacel) 1/20/2015, 11/20/2012    Varicella 2/6/2008      Preventive Care        Date Due    Yearly Flu Vaccine (1) 9/1/2017    Pap Smear 2/22/2019    Tetanus Combination Vaccine (6 - Td) 1/20/2025            MyChart Signup           Our records indicate that you have an active MyChart account.     You can view your After Visit Summary by going to

## 2018-06-28 ENCOUNTER — OFFICE VISIT (OUTPATIENT)
Dept: PRIMARY CARE CLINIC | Age: 23
End: 2018-06-28

## 2018-06-28 VITALS
BODY MASS INDEX: 29.4 KG/M2 | OXYGEN SATURATION: 98 % | RESPIRATION RATE: 18 BRPM | WEIGHT: 172.2 LBS | SYSTOLIC BLOOD PRESSURE: 115 MMHG | DIASTOLIC BLOOD PRESSURE: 76 MMHG | HEART RATE: 59 BPM | HEIGHT: 64 IN

## 2018-06-28 DIAGNOSIS — H61.23 EXCESSIVE CERUMEN IN BOTH EAR CANALS: ICD-10-CM

## 2018-06-28 DIAGNOSIS — H61.23 BILATERAL HEARING LOSS DUE TO CERUMEN IMPACTION: ICD-10-CM

## 2018-06-28 PROCEDURE — G8427 DOCREV CUR MEDS BY ELIG CLIN: HCPCS | Performed by: INTERNAL MEDICINE

## 2018-06-28 PROCEDURE — 99213 OFFICE O/P EST LOW 20 MIN: CPT | Performed by: INTERNAL MEDICINE

## 2018-06-28 PROCEDURE — G8417 CALC BMI ABV UP PARAM F/U: HCPCS | Performed by: INTERNAL MEDICINE

## 2018-06-28 PROCEDURE — 1036F TOBACCO NON-USER: CPT | Performed by: INTERNAL MEDICINE

## 2018-06-28 ASSESSMENT — ENCOUNTER SYMPTOMS
ABDOMINAL PAIN: 0
EYES NEGATIVE: 1
RHINORRHEA: 0
GASTROINTESTINAL NEGATIVE: 1
VOMITING: 0
DIARRHEA: 0
COUGH: 0
RESPIRATORY NEGATIVE: 1
SORE THROAT: 0

## 2018-06-28 ASSESSMENT — PATIENT HEALTH QUESTIONNAIRE - PHQ9
SUM OF ALL RESPONSES TO PHQ9 QUESTIONS 1 & 2: 0
1. LITTLE INTEREST OR PLEASURE IN DOING THINGS: 0
2. FEELING DOWN, DEPRESSED OR HOPELESS: 0
SUM OF ALL RESPONSES TO PHQ QUESTIONS 1-9: 0

## 2018-07-20 ENCOUNTER — TELEPHONE (OUTPATIENT)
Dept: PRIMARY CARE CLINIC | Age: 23
End: 2018-07-20

## 2018-11-01 ENCOUNTER — OFFICE VISIT (OUTPATIENT)
Dept: PRIMARY CARE CLINIC | Age: 23
End: 2018-11-01
Payer: COMMERCIAL

## 2018-11-01 VITALS
TEMPERATURE: 98.6 F | BODY MASS INDEX: 28.68 KG/M2 | DIASTOLIC BLOOD PRESSURE: 62 MMHG | WEIGHT: 168 LBS | HEIGHT: 64 IN | HEART RATE: 67 BPM | SYSTOLIC BLOOD PRESSURE: 98 MMHG | OXYGEN SATURATION: 98 %

## 2018-11-01 DIAGNOSIS — H92.02 LEFT EAR PAIN: Primary | ICD-10-CM

## 2018-11-01 DIAGNOSIS — Z00.00 PREVENTATIVE HEALTH CARE: ICD-10-CM

## 2018-11-01 PROCEDURE — 1036F TOBACCO NON-USER: CPT | Performed by: INTERNAL MEDICINE

## 2018-11-01 PROCEDURE — G8417 CALC BMI ABV UP PARAM F/U: HCPCS | Performed by: INTERNAL MEDICINE

## 2018-11-01 PROCEDURE — G8427 DOCREV CUR MEDS BY ELIG CLIN: HCPCS | Performed by: INTERNAL MEDICINE

## 2018-11-01 PROCEDURE — 99213 OFFICE O/P EST LOW 20 MIN: CPT | Performed by: INTERNAL MEDICINE

## 2018-11-01 PROCEDURE — G8484 FLU IMMUNIZE NO ADMIN: HCPCS | Performed by: INTERNAL MEDICINE

## 2018-11-01 RX ORDER — AMOXICILLIN AND CLAVULANATE POTASSIUM 875; 125 MG/1; MG/1
1 TABLET, FILM COATED ORAL 2 TIMES DAILY
Qty: 14 TABLET | Refills: 0 | Status: SHIPPED | OUTPATIENT
Start: 2018-11-01 | End: 2019-07-11 | Stop reason: SDUPTHER

## 2018-11-05 ASSESSMENT — ENCOUNTER SYMPTOMS
BACK PAIN: 0
CONSTIPATION: 0
COUGH: 0
ABDOMINAL PAIN: 0
SHORTNESS OF BREATH: 0
NAUSEA: 0
DIARRHEA: 0

## 2019-01-03 ENCOUNTER — TELEPHONE (OUTPATIENT)
Dept: PRIMARY CARE CLINIC | Age: 24
End: 2019-01-03

## 2019-01-03 DIAGNOSIS — L70.9 ACNE, UNSPECIFIED ACNE TYPE: Primary | ICD-10-CM

## 2019-01-07 RX ORDER — ERYTHROMYCIN AND BENZOYL PEROXIDE 30; 50 MG/G; MG/G
GEL TOPICAL
Qty: 46.6 G | Refills: 2 | Status: SHIPPED | OUTPATIENT
Start: 2019-01-07 | End: 2019-02-06

## 2019-02-11 ENCOUNTER — OFFICE VISIT (OUTPATIENT)
Dept: PRIMARY CARE CLINIC | Age: 24
End: 2019-02-11
Payer: COMMERCIAL

## 2019-02-11 VITALS
HEIGHT: 63 IN | TEMPERATURE: 97.8 F | SYSTOLIC BLOOD PRESSURE: 105 MMHG | HEART RATE: 77 BPM | BODY MASS INDEX: 30.12 KG/M2 | DIASTOLIC BLOOD PRESSURE: 66 MMHG | OXYGEN SATURATION: 98 % | WEIGHT: 170 LBS

## 2019-02-11 DIAGNOSIS — Z00.00 PREVENTATIVE HEALTH CARE: ICD-10-CM

## 2019-02-11 DIAGNOSIS — Z11.3 SCREENING EXAMINATION FOR STD (SEXUALLY TRANSMITTED DISEASE): ICD-10-CM

## 2019-02-11 DIAGNOSIS — L70.0 ACNE VULGARIS: ICD-10-CM

## 2019-02-11 DIAGNOSIS — Z11.1 TUBERCULOSIS SCREENING: ICD-10-CM

## 2019-02-11 DIAGNOSIS — Z00.00 PREVENTATIVE HEALTH CARE: Primary | ICD-10-CM

## 2019-02-11 PROBLEM — S80.869A FLEA BITE OF LOWER LEG: Status: RESOLVED | Noted: 2017-10-06 | Resolved: 2019-02-11
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
## 2019-02-11 PROBLEM — H61.23 BILATERAL HEARING LOSS DUE TO CERUMEN IMPACTION: Status: RESOLVED | Noted: 2018-06-28 | Resolved: 2019-02-11

## 2019-02-11 LAB
ALBUMIN SERPL-MCNC: 3.9 G/DL (ref 3.4–5)
ALP BLD-CCNC: 80 U/L (ref 40–129)
ALT SERPL-CCNC: 12 U/L (ref 10–40)
ANION GAP SERPL CALCULATED.3IONS-SCNC: 13 MMOL/L (ref 3–16)
AST SERPL-CCNC: 13 U/L (ref 15–37)
BASOPHILS ABSOLUTE: 0 K/UL (ref 0–0.2)
BASOPHILS RELATIVE PERCENT: 0.4 %
BILIRUB SERPL-MCNC: <0.2 MG/DL (ref 0–1)
BILIRUBIN DIRECT: <0.2 MG/DL (ref 0–0.3)
BILIRUBIN, INDIRECT: ABNORMAL MG/DL (ref 0–1)
BUN BLDV-MCNC: 10 MG/DL (ref 7–20)
CALCIUM SERPL-MCNC: 9.6 MG/DL (ref 8.3–10.6)
CHLORIDE BLD-SCNC: 99 MMOL/L (ref 99–110)
CHOLESTEROL, TOTAL: 156 MG/DL (ref 0–199)
CO2: 24 MMOL/L (ref 21–32)
CREAT SERPL-MCNC: <0.5 MG/DL (ref 0.6–1.1)
EOSINOPHILS ABSOLUTE: 0.1 K/UL (ref 0–0.6)
EOSINOPHILS RELATIVE PERCENT: 3.6 %
GFR AFRICAN AMERICAN: >60
GFR NON-AFRICAN AMERICAN: >60
GLUCOSE BLD-MCNC: 83 MG/DL (ref 70–99)
HCT VFR BLD CALC: 40 % (ref 36–48)
HDLC SERPL-MCNC: 53 MG/DL (ref 40–60)
HEMOGLOBIN: 13.5 G/DL (ref 12–16)
HEPATITIS C ANTIBODY INTERPRETATION: NORMAL
LDL CHOLESTEROL CALCULATED: 88 MG/DL
LYMPHOCYTES ABSOLUTE: 1.4 K/UL (ref 1–5.1)
LYMPHOCYTES RELATIVE PERCENT: 36.6 %
MCH RBC QN AUTO: 29.2 PG (ref 26–34)
MCHC RBC AUTO-ENTMCNC: 33.8 G/DL (ref 31–36)
MCV RBC AUTO: 86.3 FL (ref 80–100)
MONOCYTES ABSOLUTE: 0.4 K/UL (ref 0–1.3)
MONOCYTES RELATIVE PERCENT: 9.3 %
NEUTROPHILS ABSOLUTE: 2 K/UL (ref 1.7–7.7)
NEUTROPHILS RELATIVE PERCENT: 50.1 %
PDW BLD-RTO: 12.9 % (ref 12.4–15.4)
PHOSPHORUS: 2.8 MG/DL (ref 2.5–4.9)
PLATELET # BLD: 262 K/UL (ref 135–450)
PMV BLD AUTO: 9.6 FL (ref 5–10.5)
POTASSIUM SERPL-SCNC: 4.1 MMOL/L (ref 3.5–5.1)
RBC # BLD: 4.64 M/UL (ref 4–5.2)
SODIUM BLD-SCNC: 136 MMOL/L (ref 136–145)
TOTAL PROTEIN: 7 G/DL (ref 6.4–8.2)
TRIGL SERPL-MCNC: 74 MG/DL (ref 0–150)
TSH REFLEX: 0.72 UIU/ML (ref 0.27–4.2)
VLDLC SERPL CALC-MCNC: 15 MG/DL
WBC # BLD: 3.9 K/UL (ref 4–11)

## 2019-02-11 PROCEDURE — G8484 FLU IMMUNIZE NO ADMIN: HCPCS | Performed by: INTERNAL MEDICINE

## 2019-02-11 PROCEDURE — 99395 PREV VISIT EST AGE 18-39: CPT | Performed by: INTERNAL MEDICINE

## 2019-02-11 RX ORDER — CLINDAMYCIN PHOSPHATE AND BENZOYL PEROXIDE 10; 50 MG/G; MG/G
GEL TOPICAL
Qty: 45 G | Refills: 5 | Status: SHIPPED | OUTPATIENT
Start: 2019-02-11 | End: 2020-10-05

## 2019-02-11 ASSESSMENT — ENCOUNTER SYMPTOMS
CONSTIPATION: 0
NAUSEA: 0
SHORTNESS OF BREATH: 0
VOMITING: 0
COUGH: 0
ABDOMINAL PAIN: 0
DIARRHEA: 0

## 2019-02-11 ASSESSMENT — PATIENT HEALTH QUESTIONNAIRE - PHQ9
SUM OF ALL RESPONSES TO PHQ QUESTIONS 1-9: 0
2. FEELING DOWN, DEPRESSED OR HOPELESS: 0
SUM OF ALL RESPONSES TO PHQ QUESTIONS 1-9: 0
SUM OF ALL RESPONSES TO PHQ9 QUESTIONS 1 & 2: 0
1. LITTLE INTEREST OR PLEASURE IN DOING THINGS: 0

## 2019-02-12 LAB
C. TRACHOMATIS DNA ,URINE: NEGATIVE
ESTIMATED AVERAGE GLUCOSE: 105.4 MG/DL
HBA1C MFR BLD: 5.3 %
HIV AG/AB: NORMAL
HIV ANTIGEN: NORMAL
HIV-1 ANTIBODY: NORMAL
HIV-2 AB: NORMAL
N. GONORRHOEAE DNA, URINE: NEGATIVE
TOTAL SYPHILLIS IGG/IGM: NORMAL

## 2019-02-14 LAB
APTIMA MEDIA TYPE: NORMAL
QUANTI TB GOLD PLUS: NEGATIVE
QUANTI TB1 MINUS NIL: 0.03 IU/ML (ref 0–0.34)
QUANTI TB2 MINUS NIL: 0.07 IU/ML (ref 0–0.34)
QUANTIFERON MITOGEN: >10 IU/ML
QUANTIFERON NIL: 0.03 IU/ML
SPECIMEN SOURCE: NORMAL
T. VAGINALIS AMPLIFIED: NEGATIVE

## 2019-02-19 ENCOUNTER — TELEPHONE (OUTPATIENT)
Dept: PRIMARY CARE CLINIC | Age: 24
End: 2019-02-19

## 2019-04-10 ENCOUNTER — OFFICE VISIT (OUTPATIENT)
Dept: DERMATOLOGY | Age: 24
End: 2019-04-10
Payer: COMMERCIAL

## 2019-04-10 DIAGNOSIS — L70.0 ACNE VULGARIS: Primary | ICD-10-CM

## 2019-04-10 PROCEDURE — 99203 OFFICE O/P NEW LOW 30 MIN: CPT | Performed by: DERMATOLOGY

## 2019-04-10 RX ORDER — TRETINOIN 0.5 MG/G
CREAM TOPICAL
Qty: 45 G | Refills: 5 | Status: SHIPPED | OUTPATIENT
Start: 2019-04-10 | End: 2020-10-05

## 2019-04-10 NOTE — PROGRESS NOTES
[facility], [facility]) Physicians    Previous clinic visit: none      CC:  acne vulgaris    HPI:    1.)  Here today to discuss issues with facial acne. Is flaring. Is currently using BPO and clindamycin topical with significant improvement in number and severity of acne bumps. Is mostly concerned about PIH today. Had tubes tied at last pregnancy delivery. Is not on birth control of any kind. DERM HISTORY:   Personal history of NMSC or MM- no    Family history of NMSC or MM- no   Sunburns easily- No   Uses sunscreen- No  History of tanning bed use- No    ADDITIONAL HISTORY:    I have reviewed past medical and surgical histories, current medications, allergies, social and family histories as documented in the patient's electronic medical record.      Current Outpatient Medications   Medication Sig Dispense Refill    Clindamycin-Benzoyl Per, Refr, 1.2-5 % GEL Use twice daily as needed for acne 45 g 5    aspirin 81 MG chewable tablet Take 81 mg by mouth      IRON PO Take 1 tablet by mouth      Pramoxine HCl 1 % LOTN Apply 1 actuation topically 3 times daily as needed (itching from fleas) 222 mL 1    loratadine (CLARITIN) 10 MG tablet Take 1 tablet by mouth daily      acetaminophen (AMINOFEN) 325 MG tablet Take 2 tablets by mouth every 4 hours as needed for Pain 120 tablet 3    fluticasone (FLONASE) 50 MCG/ACT nasal spray 1 spray by Nasal route daily 1 Bottle 3     No current facility-administered medications for this visit.         ROS:    General: No fevers, chills, sweats, unexplained weight loss or weight gain, fatigue, malaise   Skin: Denies additional lesions    Heme: No history of bleeding diatheses    Allergy: Denies seasonal or environmental allergies or other medication allergies     PHYSICAL EXAM:    General: Well-appearing, NAD      Integument: Examination was performed of the following and were unremarkable except as otherwise noted below:psych/neuro, scalp, hair, face, ears, conjunctivae/eyelids, gums/teeth/lips, buccal mucosa, oropharynx, neck, breast/axilla/chest, abdomen, groin, back, buttocks, RUE, LUE, RLE, LLE, digits/nails. Genital exam deferred per patient.      Abnormalities noted include:    1.) Cheeks, chin, forehead with scant inflammatory papules. There are numerous dark brown round macules. ASSESSMENT AND PLAN:    1.)  Acne vulgaris, inflammatory, markedly improved with just topical but left with severe PIH:  - Continue bpo/clinda topical in AM  - Start tret 0.05% cream qhs; edu: photosensitivity, irritation, desquamation, redness   - Consider bleaching agent at next visit if necessary       Return to Clinic:  8 weeks  Discussed plan with patient and/or primary caretaker. Patient to call clinic with any questions / concerns. Reviewed side effects of treatment(s) and/or medication(s) with patient and/or primary caretaker.    AVS provided or is available on [employer]   ____________________________________________________________________________   Prashant Roa MD, MPH, FAAD  [facility], [address] [address]

## 2019-04-10 NOTE — PATIENT INSTRUCTIONS
Continue benzoyl peroxide/clindamycin combo in AM    Start tretinoin 0.05% cream in PM before bed    Start a NON-COMEDOGENIC OIL FREE moisturizer as needed

## 2019-04-18 ENCOUNTER — TELEPHONE (OUTPATIENT)
Dept: PRIMARY CARE CLINIC | Age: 24
End: 2019-04-18

## 2019-04-18 DIAGNOSIS — J30.2 SEASONAL ALLERGIC RHINITIS: ICD-10-CM

## 2019-04-18 RX ORDER — FLUTICASONE PROPIONATE 50 MCG
1 SPRAY, SUSPENSION (ML) NASAL DAILY
Qty: 1 BOTTLE | Refills: 3 | Status: SHIPPED | OUTPATIENT
Start: 2019-04-18 | End: 2020-10-05

## 2019-04-18 RX ORDER — LORATADINE 10 MG/1
10 TABLET ORAL DAILY
Qty: 30 TABLET | Refills: 2 | Status: SHIPPED | OUTPATIENT
Start: 2019-04-18 | End: 2020-10-05

## 2019-04-30 ENCOUNTER — OFFICE VISIT (OUTPATIENT)
Dept: PRIMARY CARE CLINIC | Age: 24
End: 2019-04-30
Payer: COMMERCIAL

## 2019-04-30 VITALS
WEIGHT: 176.2 LBS | SYSTOLIC BLOOD PRESSURE: 113 MMHG | OXYGEN SATURATION: 98 % | DIASTOLIC BLOOD PRESSURE: 71 MMHG | HEIGHT: 64 IN | HEART RATE: 86 BPM | BODY MASS INDEX: 30.08 KG/M2

## 2019-04-30 DIAGNOSIS — J02.9 SORE THROAT: Primary | ICD-10-CM

## 2019-04-30 DIAGNOSIS — R05.9 COUGH: ICD-10-CM

## 2019-04-30 LAB — S PYO AG THROAT QL: NORMAL

## 2019-04-30 PROCEDURE — 87880 STREP A ASSAY W/OPTIC: CPT | Performed by: FAMILY MEDICINE

## 2019-04-30 PROCEDURE — 1036F TOBACCO NON-USER: CPT | Performed by: FAMILY MEDICINE

## 2019-04-30 PROCEDURE — G8427 DOCREV CUR MEDS BY ELIG CLIN: HCPCS | Performed by: FAMILY MEDICINE

## 2019-04-30 PROCEDURE — G8417 CALC BMI ABV UP PARAM F/U: HCPCS | Performed by: FAMILY MEDICINE

## 2019-04-30 PROCEDURE — 99213 OFFICE O/P EST LOW 20 MIN: CPT | Performed by: FAMILY MEDICINE

## 2019-04-30 RX ORDER — ACETAMINOPHEN 500 MG
500 TABLET ORAL 4 TIMES DAILY PRN
Qty: 40 TABLET | Refills: 1 | Status: SHIPPED | OUTPATIENT
Start: 2019-04-30 | End: 2020-10-05

## 2019-04-30 NOTE — PROGRESS NOTES
Subjective:      Patient ID: Kurt Lopez is a 25 y.o. female. Sore throat  HPI 26 y/o female with sore throat for 2 days. Seems to be getting worse. No fever. Did get cough last pm.  Now prod. Cough      Appetite is ok  No previous strep  Does not smoke      Review of Systems    Objective:   Physical Exam   Constitutional: She is oriented to person, place, and time. She appears well-developed and well-nourished. No distress. Mild redness to posterior pharynx without exudate   HENT:   Head: Normocephalic and atraumatic. Right Ear: External ear normal.   Left Ear: External ear normal.   Nose: Nose normal.   Mouth/Throat: No oropharyngeal exudate. Eyes: Pupils are equal, round, and reactive to light. Conjunctivae and EOM are normal. Left eye exhibits no discharge. No scleral icterus. Neck: Normal range of motion. Neck supple. No JVD present. No tracheal deviation present. No thyromegaly present. No cervical adenitis   Cardiovascular: Normal rate, regular rhythm, normal heart sounds and intact distal pulses. Exam reveals no gallop and no friction rub. No murmur heard. Pulmonary/Chest: Effort normal and breath sounds normal. No stridor. No respiratory distress. She has no wheezes. She has no rales. She exhibits no tenderness. Abdominal: Soft. Bowel sounds are normal. She exhibits no distension and no mass. There is no tenderness. There is no rebound and no guarding. Musculoskeletal: Normal range of motion. She exhibits no edema or tenderness. Lymphadenopathy:     She has no cervical adenopathy. Neurological: She is alert and oriented to person, place, and time. She has normal reflexes. She displays normal reflexes. No cranial nerve deficit. Coordination normal.   Skin: Skin is warm and dry. No rash noted. She is not diaphoretic. No erythema. No pallor. Psychiatric: She has a normal mood and affect.  Her behavior is normal. Judgment and thought content normal.   Nursing note and vitals reviewed. 1. Sore throat  Viral in nature  - POCT rapid strep A  neg  - acetaminophen (TYLENOL) 500 MG tablet; Take 1 tablet by mouth 4 times daily as needed for Pain  Dispense: 40 tablet; Refill: 1  Mouth wash/gargle  2.  Cough    No treatment needed                      Plan:              Shy Tatum MD

## 2019-07-11 ENCOUNTER — OFFICE VISIT (OUTPATIENT)
Dept: INTERNAL MEDICINE CLINIC | Age: 24
End: 2019-07-11
Payer: COMMERCIAL

## 2019-07-11 VITALS
DIASTOLIC BLOOD PRESSURE: 65 MMHG | BODY MASS INDEX: 29.19 KG/M2 | SYSTOLIC BLOOD PRESSURE: 102 MMHG | OXYGEN SATURATION: 99 % | HEIGHT: 64 IN | HEART RATE: 88 BPM | TEMPERATURE: 98.6 F | WEIGHT: 171 LBS

## 2019-07-11 DIAGNOSIS — R42 DIZZINESS: Primary | ICD-10-CM

## 2019-07-11 PROBLEM — Z12.4 CERVICAL CANCER SCREENING: Status: ACTIVE | Noted: 2018-10-29

## 2019-07-11 PROBLEM — Z86.19 HISTORY OF SEXUALLY TRANSMITTED DISEASE: Status: ACTIVE | Noted: 2017-09-14

## 2019-07-11 PROBLEM — O34.219 PREVIOUS CESAREAN SECTION COMPLICATING PREGNANCY: Status: ACTIVE | Noted: 2017-09-14

## 2019-07-11 PROBLEM — O09.91 ENCOUNTER FOR SUPERVISION OF HIGH RISK PREGNANCY IN FIRST TRIMESTER, ANTEPARTUM: Status: ACTIVE | Noted: 2017-09-14

## 2019-07-11 PROCEDURE — 1036F TOBACCO NON-USER: CPT | Performed by: NURSE PRACTITIONER

## 2019-07-11 PROCEDURE — G8427 DOCREV CUR MEDS BY ELIG CLIN: HCPCS | Performed by: NURSE PRACTITIONER

## 2019-07-11 PROCEDURE — G8417 CALC BMI ABV UP PARAM F/U: HCPCS | Performed by: NURSE PRACTITIONER

## 2019-07-11 PROCEDURE — 99214 OFFICE O/P EST MOD 30 MIN: CPT | Performed by: NURSE PRACTITIONER

## 2019-07-11 RX ORDER — AMOXICILLIN AND CLAVULANATE POTASSIUM 875; 125 MG/1; MG/1
1 TABLET, FILM COATED ORAL 2 TIMES DAILY
Qty: 14 TABLET | Refills: 0 | Status: SHIPPED | OUTPATIENT
Start: 2019-07-11 | End: 2019-07-18

## 2019-07-11 ASSESSMENT — ENCOUNTER SYMPTOMS
VOICE CHANGE: 0
SORE THROAT: 0
FACIAL SWELLING: 0
NAUSEA: 1
TROUBLE SWALLOWING: 0
VOMITING: 0

## 2019-07-25 ENCOUNTER — TELEPHONE (OUTPATIENT)
Dept: PRIMARY CARE CLINIC | Age: 24
End: 2019-07-25

## 2019-07-29 ENCOUNTER — TELEPHONE (OUTPATIENT)
Dept: PRIMARY CARE CLINIC | Age: 24
End: 2019-07-29

## 2019-08-10 PROBLEM — Z12.4 CERVICAL CANCER SCREENING: Status: RESOLVED | Noted: 2018-10-29 | Resolved: 2019-08-10

## 2019-09-19 ENCOUNTER — TELEPHONE (OUTPATIENT)
Dept: PRIMARY CARE CLINIC | Age: 24
End: 2019-09-19

## 2019-09-19 NOTE — TELEPHONE ENCOUNTER
Pt states she needs the xray that was done faxed to her employer to show she does not have TB    Results was faxed over successfully

## 2019-09-23 ENCOUNTER — TELEPHONE (OUTPATIENT)
Dept: ADMINISTRATIVE | Age: 24
End: 2019-09-23

## 2019-09-23 NOTE — TELEPHONE ENCOUNTER
Per Dr. Cece Sheth, it is OK to fax the previous physical per patient's request.     I did tell pt she will need to call the ordering physician's office to have the chest x-ray faxed from their office.

## 2019-10-30 ENCOUNTER — OFFICE VISIT (OUTPATIENT)
Dept: PRIMARY CARE CLINIC | Age: 24
End: 2019-10-30
Payer: COMMERCIAL

## 2019-10-30 VITALS
HEART RATE: 76 BPM | DIASTOLIC BLOOD PRESSURE: 68 MMHG | HEIGHT: 64 IN | BODY MASS INDEX: 30.05 KG/M2 | SYSTOLIC BLOOD PRESSURE: 111 MMHG | WEIGHT: 176 LBS

## 2019-10-30 DIAGNOSIS — L24.81 IRRITANT CONTACT DERMATITIS DUE TO METALS: Primary | ICD-10-CM

## 2019-10-30 PROCEDURE — G8427 DOCREV CUR MEDS BY ELIG CLIN: HCPCS | Performed by: INTERNAL MEDICINE

## 2019-10-30 PROCEDURE — 1036F TOBACCO NON-USER: CPT | Performed by: INTERNAL MEDICINE

## 2019-10-30 PROCEDURE — G8484 FLU IMMUNIZE NO ADMIN: HCPCS | Performed by: INTERNAL MEDICINE

## 2019-10-30 PROCEDURE — 99213 OFFICE O/P EST LOW 20 MIN: CPT | Performed by: INTERNAL MEDICINE

## 2019-10-30 PROCEDURE — G8417 CALC BMI ABV UP PARAM F/U: HCPCS | Performed by: INTERNAL MEDICINE

## 2019-10-30 RX ORDER — DIAPER,BRIEF,INFANT-TODD,DISP
EACH MISCELLANEOUS
Qty: 3 TUBE | Refills: 1 | Status: SHIPPED | OUTPATIENT
Start: 2019-10-30 | End: 2019-11-06

## 2019-10-30 RX ORDER — FEXOFENADINE HCL 180 MG/1
180 TABLET ORAL DAILY
Qty: 20 TABLET | Refills: 0 | Status: SHIPPED | OUTPATIENT
Start: 2019-10-30 | End: 2019-11-19

## 2019-10-30 ASSESSMENT — ENCOUNTER SYMPTOMS
SHORTNESS OF BREATH: 0
WHEEZING: 0
COUGH: 0
EYES NEGATIVE: 1
ABDOMINAL PAIN: 0
ABDOMINAL DISTENTION: 0
GASTROINTESTINAL NEGATIVE: 1
CHEST TIGHTNESS: 0

## 2020-01-20 ENCOUNTER — HOSPITAL ENCOUNTER (EMERGENCY)
Age: 25
Discharge: HOME | End: 2020-01-20
Payer: COMMERCIAL

## 2020-01-20 VITALS
SYSTOLIC BLOOD PRESSURE: 105 MMHG | DIASTOLIC BLOOD PRESSURE: 56 MMHG | TEMPERATURE: 98.4 F | RESPIRATION RATE: 16 BRPM | HEART RATE: 64 BPM | OXYGEN SATURATION: 100 % | WEIGHT: 182.32 LBS | BODY MASS INDEX: 31.79 KG/M2

## 2020-01-20 LAB
RAPID INFLUENZA  B AGN: NEGATIVE
RAPID INFLUENZA A AGN: NEGATIVE
S PYO AG THROAT QL: NEGATIVE

## 2020-01-20 PROCEDURE — 87880 STREP A ASSAY W/OPTIC: CPT

## 2020-01-20 PROCEDURE — 87804 INFLUENZA ASSAY W/OPTIC: CPT

## 2020-01-20 PROCEDURE — 99283 EMERGENCY DEPT VISIT LOW MDM: CPT

## 2020-01-20 PROCEDURE — 87081 CULTURE SCREEN ONLY: CPT

## 2020-01-20 RX ORDER — DEXTROMETHORPHAN POLISTIREX 30 MG/5ML
60 SUSPENSION ORAL EVERY 12 HOURS PRN
Qty: 220 ML | Refills: 0 | Status: SHIPPED | OUTPATIENT
Start: 2020-01-20 | End: 2020-01-30

## 2020-01-20 ASSESSMENT — ENCOUNTER SYMPTOMS
APNEA: 0
EYE REDNESS: 0
EYE DISCHARGE: 0
ABDOMINAL PAIN: 0
CHOKING: 0
COUGH: 1
SHORTNESS OF BREATH: 0
VOMITING: 0
NAUSEA: 0
SORE THROAT: 1
FACIAL SWELLING: 0
BACK PAIN: 0

## 2020-01-20 ASSESSMENT — PAIN DESCRIPTION - PROGRESSION: CLINICAL_PROGRESSION: NOT CHANGED

## 2020-01-20 ASSESSMENT — PAIN DESCRIPTION - ORIENTATION: ORIENTATION: RIGHT;LEFT

## 2020-01-20 ASSESSMENT — PAIN DESCRIPTION - PAIN TYPE: TYPE: ACUTE PAIN

## 2020-01-20 ASSESSMENT — PAIN DESCRIPTION - LOCATION: LOCATION: THROAT

## 2020-01-20 ASSESSMENT — PAIN DESCRIPTION - FREQUENCY: FREQUENCY: CONTINUOUS

## 2020-01-20 ASSESSMENT — PAIN DESCRIPTION - DESCRIPTORS: DESCRIPTORS: ACHING

## 2020-01-20 ASSESSMENT — PAIN SCALES - GENERAL: PAINLEVEL_OUTOF10: 6

## 2020-01-20 ASSESSMENT — PAIN DESCRIPTION - ONSET: ONSET: ON-GOING

## 2020-01-20 ASSESSMENT — PAIN - FUNCTIONAL ASSESSMENT: PAIN_FUNCTIONAL_ASSESSMENT: ACTIVITIES ARE NOT PREVENTED

## 2020-01-20 NOTE — ED PROVIDER NOTES
**EVALUATED BY ADVANCED PRACTICE PROVIDER**        [address]      Pt Name: Otoniel Lacy  OJC:[id number]  Teodorogfrosalba 1995  Date of evaluation: 2020  Provider: Aldo Teresa PA-C      Chief Complaint:    Chief Complaint   Patient presents with    Pharyngitis     cough, sore throat start this morning. boyfriend dx with flu yesterday       Nursing Notes, Past Medical Hx, Past Surgical Hx, Social Hx, Allergies, and Family Hx were all reviewed and agreed with or any disagreements were addressed in the HPI.    HPI:  (Location, Duration, Timing, Severity, Quality, Assoc Sx, Context, Modifying factors)  This is a  25 y.o. female emergency room course: Patient complained sore throat, slight cough, no fever, no diarrhea nausea or vomiting. Symptoms started yesterday. Her boyfriend was diagnosed with the flu she brought him in 2 days ago. She denies headache. No weaknesses. Cough with a greenish mucus. No other complaints.     PastMedical/Surgical History:      Diagnosis Date    Counseling on other sexually transmitted diseases 2012    Elevated LFTs 13    viral negative    History of eclampsia 2014    Overview:  Eclampsia postpartum after G1 in 2014    Mid-back pain, acute 2014    Otitis media, chronic, bilateral 2012    PIH (pregnancy induced hypertension) 2014    Positive TB test     negative chest XR    Preeclampsia 2014    post partum         Procedure Laterality Date    ADENOIDECTOMY       SECTION      DENTAL SURGERY      root canal    TONSILLECTOMY      TUBAL LIGATION         Medications:  Previous Medications    ACETAMINOPHEN (TYLENOL) 500 MG TABLET    Take 1 tablet by mouth 4 times daily as needed for Pain    CLINDAMYCIN-BENZOYL PER, REFR, 1.2-5 % GEL    Use twice daily as needed for acne    FLUTICASONE (FLONASE) 50 MCG/ACT NASAL SPRAY    1 spray by Nasal route daily LORATADINE (CLARITIN) 10 MG TABLET    Take 1 tablet by mouth daily    TRETINOIN (RETIN-A) 0.05 % CREAM    Apply a pea sized amount to the face QHS         Review of Systems:  Review of Systems   Constitutional: Negative for chills and fever. HENT: Positive for sore throat. Negative for congestion and facial swelling. Eyes: Negative for discharge and redness. Respiratory: Positive for cough. Negative for apnea, choking and shortness of breath. Cardiovascular: Negative for chest pain. Gastrointestinal: Negative for abdominal pain, nausea and vomiting. Genitourinary: Negative for dysuria. Musculoskeletal: Positive for myalgias. Negative for back pain, neck pain and neck stiffness. Neurological: Negative for dizziness, tremors, seizures, weakness and headaches. All other systems reviewed and are negative. Positives and Pertinent negatives as per HPI. Except as noted above in the ROS, problem specific ROS was completed and is negative. Physical Exam:  Physical Exam  Constitutional:       Appearance: She is well-developed. She is not diaphoretic. HENT:      Head: Normocephalic and atraumatic. Nose: Nose normal.      Mouth/Throat:      Pharynx: Oropharynx is clear. Tonsils: No tonsillar exudate. Swellin on the right. 0 on the left. Eyes:      General:         Right eye: No discharge. Left eye: No discharge. Extraocular Movements: Extraocular movements intact. Conjunctiva/sclera: Conjunctivae normal.      Pupils: Pupils are equal, round, and reactive to light. Neck:      Musculoskeletal: Normal range of motion and neck supple. Cardiovascular:      Rate and Rhythm: Normal rate and regular rhythm. Heart sounds: Normal heart sounds. No murmur. No friction rub. No gallop. Pulmonary:      Effort: Pulmonary effort is normal. No respiratory distress. Breath sounds: Normal breath sounds. No wheezing or rales. Chest:      Chest wall: No tenderness. Abdominal:      General: Abdomen is flat. Bowel sounds are normal.      Palpations: Abdomen is soft. Musculoskeletal: Normal range of motion. Skin:     General: Skin is warm and dry. Neurological:      Mental Status: She is alert and oriented to person, place, and time. Psychiatric:         Behavior: Behavior normal.         MEDICAL DECISION MAKING    Vitals:    Vitals:    01/20/20 1303   BP: (!) 105/56   Pulse: 64   Resp: 16   Temp: 98.4 °F (36.9 °C)   TempSrc: Temporal   SpO2: 100%   Weight: 182 lb 5.1 oz (82.7 kg)       LABS:  Labs Reviewed   RAPID INFLUENZA A/B ANTIGENS    Narrative:     Performed at:  [facility]  [address] [place] [address]   Phone [phone]   STREP SCREEN GROUP A THROAT    Narrative:     Performed at:  [facility]  [address]   Phone [phone]   CULTURE BETA STREP CONFIRM PLATE        Remainder of labs reviewed and werenegative at this time or not returned at the time of this note. RADIOLOGY:   Non-plain film images such as CT, Ultrasound and MRI are read by the radiologist. Jodi Núñez PA-C have directly visualized the radiologic plain film image(s) with the below findings:        Interpretation per the Radiologist below, if available at the time of this note:    No orders to display        No results found. MEDICAL DECISION MAKING / ED COURSE:      PROCEDURES:   Procedures    None    Patient was given:  Medications - No data to display    Emergency room course: Patient on exam throat is clear nonerythematous no exudate. Uvula midline without swelling. Neck is supple full range of motion without tenderness. No nuchal rigidity. No adenopathy with palpation. Cardiovascular regular rate rhythm, lungs are clear no wheeze rales or rhonchi. Skin show no rash. Patient is alert and oriented x4. Does not appear to be in acute distress.   Abdomen is

## 2020-01-20 NOTE — LETTER
[address] [facility]  [address] [place] [address]  Phone: [phone]               January 20, 2020    Patient: Carolina Cordero   YOB: 1995   Date of Visit: 1/20/2020       To Whom It May Concern:    Margaret Iyer was seen and treated in our emergency department on 1/20/2020. She may return to work on 1/21/2020.       Sincerely,       Sushant Tompkins RN         Signature:__________________________________

## 2020-01-22 LAB — S PYO THROAT QL CULT: NORMAL

## 2020-10-05 ENCOUNTER — OFFICE VISIT (OUTPATIENT)
Dept: PRIMARY CARE CLINIC | Age: 25
End: 2020-10-05
Payer: COMMERCIAL

## 2020-10-05 ENCOUNTER — TELEPHONE (OUTPATIENT)
Dept: PRIMARY CARE CLINIC | Age: 25
End: 2020-10-05

## 2020-10-05 VITALS
OXYGEN SATURATION: 98 % | DIASTOLIC BLOOD PRESSURE: 71 MMHG | BODY MASS INDEX: 29.33 KG/M2 | TEMPERATURE: 97.4 F | HEIGHT: 64 IN | HEART RATE: 90 BPM | SYSTOLIC BLOOD PRESSURE: 106 MMHG | WEIGHT: 171.8 LBS

## 2020-10-05 PROCEDURE — 96160 PT-FOCUSED HLTH RISK ASSMT: CPT | Performed by: NURSE PRACTITIONER

## 2020-10-05 PROCEDURE — 99212 OFFICE O/P EST SF 10 MIN: CPT | Performed by: NURSE PRACTITIONER

## 2020-10-05 ASSESSMENT — PATIENT HEALTH QUESTIONNAIRE - PHQ9
SUM OF ALL RESPONSES TO PHQ9 QUESTIONS 1 & 2: 0
1. LITTLE INTEREST OR PLEASURE IN DOING THINGS: 0
SUM OF ALL RESPONSES TO PHQ QUESTIONS 1-9: 0
SUM OF ALL RESPONSES TO PHQ QUESTIONS 1-9: 0
2. FEELING DOWN, DEPRESSED OR HOPELESS: 0

## 2020-10-05 ASSESSMENT — ENCOUNTER SYMPTOMS
NAUSEA: 0
ABDOMINAL DISTENTION: 0
DIARRHEA: 0
ABDOMINAL PAIN: 1
SORE THROAT: 0
VOMITING: 0
SHORTNESS OF BREATH: 0
CONSTIPATION: 0
COUGH: 0

## 2020-10-05 NOTE — PROGRESS NOTES
Date: 10/5/2020                                               Subjective/Objective:     Chief Complaint   Patient presents with   [facility].       HPI     Former pt of Dr Christa Cervantes, here to establish care. Last OV was with Dr Licha Reid 10/30/2019. Has 2 children, 10 yo and 3 yo. 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 Had a tummy tuck 2020 in [place], [place]. So far recovery is going well. Is allowed to go to the gym after 6 weeks, no ab work for 1 year. Was treated for vaginal yeast infection after surgery with one day monistat. The abnormal discharge has resolved, however she continues to have burning when she urinates. PHQ - 9 score today 4. Denies feeling down/depressed. Denies h/o depression. Contraception: is sexually active. Had tubal ligation. Age/Gender Health Maintenance     DM Screen - check fasting glucose with labs   Colon Cancer Screening - Denies FH  Never smoker     Tetanus - UTD, 2018  Influenza Vaccine - needs - declines  Hep B - dose 1995. She thinks she completed them - going to look for record     HIV Screening - non reactive   Hep C Screening- non reactive      Breast Cancer Screening - Denies FH. Does perform monthly BSE-denies concerns   Cervical Cancer Screening - unsure. Denies h/o abnormal. Will RTO for pap.             Patient Active Problem List    Diagnosis Date Noted    Encounter for supervision of high risk pregnancy in first trimester, antepartum 2017    History of sexually transmitted disease 2017    Previous  section complicating pregnancy 10/30/2249    Carpal tunnel syndrome, bilateral 2017    Irritable bowel syndrome with diarrhea 08/15/2016    Gastroesophageal reflux disease 08/15/2016    Allergic rhinitis 2016    Personal history of other complications of pregnancy, childbirth and the puerperium 2014    Acne vulgaris 2012       Past Medical History: Diagnosis Date    Counseling on other sexually transmitted diseases 2012    Elevated LFTs 13    viral negative    History of eclampsia 2014    Overview:  Eclampsia postpartum after G1 in 2014    Mid-back pain, acute 2014    Otitis media, chronic, bilateral 2012    PIH (pregnancy induced hypertension) 2014    Positive TB test     negative chest XR    Preeclampsia 2014    post partum       Past Surgical History:   Procedure Laterality Date    ABDOMINOPLASTY      ADENOIDECTOMY      CARPAL TUNNEL RELEASE Right      SECTION      two    DENTAL SURGERY      root canal    TONSILLECTOMY      TUBAL LIGATION         Admission on 2020, Discharged on 2020   Component Date Value Ref Range Status    Rapid Influenza A Ag 2020 Negative  Negative Final    Rapid Influenza B Ag 2020 Negative  Negative Final    Rapid Strep A Screen 2020 Negative  Negative Final    Comment: A culture confirmation plate has been set up and a separate  report will follow. See micro report.       Strep A Culture 2020 Normal oral sidney, No Beta Strep isolated   Final       Family History   Problem Relation Age of Onset    High Cholesterol Mother     Depression Mother     Arthritis Mother     Asthma Sister     Heart Failure Maternal Aunt     Arthritis Maternal Grandmother     High Blood Pressure Maternal Grandmother     Kidney Disease Maternal Grandmother     High Blood Pressure Maternal Grandfather     Vision Loss Maternal Grandfather         glaucoma    Heart Failure Maternal Aunt     Heart Disease Paternal Grandmother     Rheum Arthritis Neg Hx     Osteoarthritis Neg Hx     Breast Cancer Neg Hx     Cancer Neg Hx     Diabetes Neg Hx     Hypertension Neg Hx     Migraines Neg Hx     Ovarian Cancer Neg Hx     Rashes/Skin Problems Neg Hx     Seizures Neg Hx     Stroke Neg Hx     Thyroid Disease Neg Hx     Other Neg Hx         VTE       No current outpatient medications on file. No current facility-administered medications for this visit. Allergies   Allergen Reactions    Nickel      Topical         Review of Systems   Constitutional: Negative for activity change, chills and fever. HENT: Negative for congestion and sore throat. Respiratory: Negative for cough and shortness of breath. Cardiovascular: Negative for chest pain and leg swelling. Gastrointestinal: Positive for abdominal pain. Negative for abdominal distention, constipation, diarrhea, nausea and vomiting. Numbness at surgical incision   Genitourinary: Negative for difficulty urinating. Musculoskeletal: Negative for arthralgias and myalgias. Skin: Negative for rash. Neurological: Negative for dizziness, weakness and headaches. Psychiatric/Behavioral: Negative for dysphoric mood and sleep disturbance. The patient is not nervous/anxious. All other systems reviewed and are negative. Vitals:  /71 (Site: Right Upper Arm, Position: Sitting, Cuff Size: Large Adult)   Pulse 90   Temp 97.4 °F (36.3 °C) (Temporal)   Ht 5' 3.5\" (1.613 m)   Wt 171 lb 12.8 oz (77.9 kg)   SpO2 98%   BMI 29.96 kg/m²     Physical Exam  Vitals signs reviewed. Constitutional:       General: She is not in acute distress. Appearance: Normal appearance. She is not ill-appearing. HENT:      Head: Normocephalic and atraumatic. Right Ear: Tympanic membrane, ear canal and external ear normal.      Left Ear: Tympanic membrane, ear canal and external ear normal.   Eyes:      Conjunctiva/sclera: Conjunctivae normal.      Pupils: Pupils are equal, round, and reactive to light. Neck:      Musculoskeletal: Normal range of motion and neck supple. Thyroid: No thyromegaly. Cardiovascular:      Rate and Rhythm: Normal rate and regular rhythm. Pulses: Normal pulses. Heart sounds: Normal heart sounds. No murmur.    Pulmonary:      Effort: Pulmonary effort is normal.      Breath sounds: Normal breath sounds. Abdominal:      General: Bowel sounds are normal. There is no distension. Palpations: Abdomen is soft. Tenderness: There is no abdominal tenderness. Musculoskeletal: Normal range of motion. General: No swelling or signs of injury. Right lower leg: No edema. Left lower leg: No edema. Lymphadenopathy:      Cervical: No cervical adenopathy. Skin:     General: Skin is warm and dry. Findings: No rash. Neurological:      General: No focal deficit present. Mental Status: She is alert and oriented to person, place, and time. Mental status is at baseline. Psychiatric:         Mood and Affect: Mood normal.         Behavior: Behavior normal.         Thought Content: Thought content normal.         Judgment: Judgment normal.         Assessment/Plan     1. Healthcare maintenance  - CBC WITH AUTO DIFFERENTIAL; Future  - COMPREHENSIVE METABOLIC PANEL; Future  - Varicella Zoster Antibody, IgG; Future      Orders Placed This Encounter   Procedures    CBC WITH AUTO DIFFERENTIAL     Standing Status:   Future     Standing Expiration Date:   10/5/2021    COMPREHENSIVE METABOLIC PANEL     Standing Status:   Future     Standing Expiration Date:   10/5/2021    Varicella Zoster Antibody, IgG     Standing Status:   Future     Standing Expiration Date:   10/5/2021       Return in about 1 year (around 10/5/2021) for physical . OR sooner with questions, concerns, worsening symptoms    DEBBY CLANCY NP    10/5/2020  3:33 PM    -Patient verbalized understanding and agreement to plan.

## 2020-10-06 ASSESSMENT — PATIENT HEALTH QUESTIONNAIRE - PHQ9
10. IF YOU CHECKED OFF ANY PROBLEMS, HOW DIFFICULT HAVE THESE PROBLEMS MADE IT FOR YOU TO DO YOUR WORK, TAKE CARE OF THINGS AT HOME, OR GET ALONG WITH OTHER PEOPLE: 0
8. MOVING OR SPEAKING SO SLOWLY THAT OTHER PEOPLE COULD HAVE NOTICED. OR THE OPPOSITE, BEING SO FIGETY OR RESTLESS THAT YOU HAVE BEEN MOVING AROUND A LOT MORE THAN USUAL: 0
5. POOR APPETITE OR OVEREATING: 1
1. LITTLE INTEREST OR PLEASURE IN DOING THINGS: 3
6. FEELING BAD ABOUT YOURSELF - OR THAT YOU ARE A FAILURE OR HAVE LET YOURSELF OR YOUR FAMILY DOWN: 0
7. TROUBLE CONCENTRATING ON THINGS, SUCH AS READING THE NEWSPAPER OR WATCHING TELEVISION: 0
SUM OF ALL RESPONSES TO PHQ QUESTIONS 1-9: 4
2. FEELING DOWN, DEPRESSED OR HOPELESS: 0
9. THOUGHTS THAT YOU WOULD BE BETTER OFF DEAD, OR OF HURTING YOURSELF: 0
4. FEELING TIRED OR HAVING LITTLE ENERGY: 0
3. TROUBLE FALLING OR STAYING ASLEEP: 0
SUM OF ALL RESPONSES TO PHQ QUESTIONS 1-9: 4
SUM OF ALL RESPONSES TO PHQ9 QUESTIONS 1 & 2: 3

## 2020-10-14 ENCOUNTER — TELEPHONE (OUTPATIENT)
Dept: PRIMARY CARE CLINIC | Age: 25
End: 2020-10-14

## 2020-10-14 NOTE — TELEPHONE ENCOUNTER
----- Message from Jimena Toussaint sent at 10/14/2020  4:05 PM EDT -----  Subject: Message to Provider    QUESTIONS  Information for Provider? Pt talked to Nataly Weinstein about a surgery that she   had on 9/17 and has post- surgery questions and wanted to know if she   should make an appointment. Also pt was suppose to have a urine sample   taken in office on 10/5 and would like to come in to do that.  ---------------------------------------------------------------------------  --------------  [address]  What is the best way for the office to contact you? OK to leave message on   voicemail  Preferred Call Back Phone Number? [phone]  ---------------------------------------------------------------------------  --------------  SCRIPT ANSWERS  Relationship to Patient?  Self

## 2021-04-07 ENCOUNTER — TELEPHONE (OUTPATIENT)
Dept: PRIMARY CARE CLINIC | Age: 26
End: 2021-04-07

## 2021-04-09 ENCOUNTER — OFFICE VISIT (OUTPATIENT)
Dept: PRIMARY CARE CLINIC | Age: 26
End: 2021-04-09
Payer: COMMERCIAL

## 2021-04-09 VITALS
HEIGHT: 64 IN | BODY MASS INDEX: 30.73 KG/M2 | WEIGHT: 180 LBS | DIASTOLIC BLOOD PRESSURE: 68 MMHG | HEART RATE: 78 BPM | SYSTOLIC BLOOD PRESSURE: 115 MMHG | TEMPERATURE: 98.3 F

## 2021-04-09 DIAGNOSIS — J30.1 SEASONAL ALLERGIC RHINITIS DUE TO POLLEN: Primary | ICD-10-CM

## 2021-04-09 DIAGNOSIS — H61.22 LEFT EAR IMPACTED CERUMEN: ICD-10-CM

## 2021-04-09 PROCEDURE — 1036F TOBACCO NON-USER: CPT | Performed by: NURSE PRACTITIONER

## 2021-04-09 PROCEDURE — G8427 DOCREV CUR MEDS BY ELIG CLIN: HCPCS | Performed by: NURSE PRACTITIONER

## 2021-04-09 PROCEDURE — 99213 OFFICE O/P EST LOW 20 MIN: CPT | Performed by: NURSE PRACTITIONER

## 2021-04-09 PROCEDURE — G8417 CALC BMI ABV UP PARAM F/U: HCPCS | Performed by: NURSE PRACTITIONER

## 2021-04-09 RX ORDER — CETIRIZINE HYDROCHLORIDE 10 MG/1
10 TABLET ORAL DAILY
Qty: 30 TABLET | Refills: 0 | Status: SHIPPED | OUTPATIENT
Start: 2021-04-09 | End: 2021-05-09

## 2021-04-09 RX ORDER — FLUTICASONE PROPIONATE 50 MCG
1 SPRAY, SUSPENSION (ML) NASAL DAILY
Qty: 1 BOTTLE | Refills: 2 | Status: SHIPPED | OUTPATIENT
Start: 2021-04-09 | End: 2021-09-07

## 2021-04-09 ASSESSMENT — PATIENT HEALTH QUESTIONNAIRE - PHQ9
SUM OF ALL RESPONSES TO PHQ9 QUESTIONS 1 & 2: 0
2. FEELING DOWN, DEPRESSED OR HOPELESS: 0

## 2021-04-09 ASSESSMENT — ENCOUNTER SYMPTOMS
SORE THROAT: 0
SHORTNESS OF BREATH: 0
SINUS PRESSURE: 1
RHINORRHEA: 1
COUGH: 0

## 2021-04-09 NOTE — PROGRESS NOTES
Subjective     CC:   Chief Complaint   Patient presents with    Allergies       HPI    Vin Ortiz is a 33 yo female, here for allergy symptoms. Symptoms began about one week ago. Having head pressure. Having some nasal stuffiness that is worse when she goes outside. Having some rhinorrhea, reports this is mild. Has sneezed only a few time. Denies fever/chills. Has tried taking tylenol without relief. Has previously taken claritin and flonase when she has these symptoms which usually provides relief. Has history of allergic rhinitis. Review of Systems   Constitutional: Negative for chills and fever. HENT: Positive for congestion, rhinorrhea and sinus pressure. Negative for ear pain and sore throat. Respiratory: Negative for cough and shortness of breath. Objective   Vitals:    04/09/21 0830   BP: 115/68   Pulse: 78   Temp: 98.3 °F (36.8 °C)   TempSrc: Temporal   Weight: 180 lb (81.6 kg)   Height: 5' 3.5\" (1.613 m)     Body mass index is 31.39 kg/m². Wt Readings from Last 3 Encounters:   04/09/21 180 lb (81.6 kg)   10/05/20 171 lb 12.8 oz (77.9 kg)   01/20/20 182 lb 5.1 oz (82.7 kg)     BP Readings from Last 3 Encounters:   04/09/21 115/68   10/05/20 106/71   01/20/20 (!) 105/56      Physical Exam  Constitutional:       General: She is not in acute distress. Appearance: Normal appearance. HENT:      Head: Normocephalic and atraumatic. Right Ear: Tympanic membrane, ear canal and external ear normal.      Left Ear: Ear canal and external ear normal. There is impacted cerumen. Cardiovascular:      Rate and Rhythm: Normal rate and regular rhythm. Heart sounds: Normal heart sounds. Pulmonary:      Effort: Pulmonary effort is normal. No respiratory distress. Breath sounds: Normal breath sounds. No wheezing. Neurological:      General: No focal deficit present. Mental Status: She is alert and oriented to person, place, and time.    Psychiatric:         Mood and Affect: Mood normal.         Behavior: Behavior normal.         Thought Content: Thought content normal.         Judgment: Judgment normal.          Diagnosis Orders   1. Seasonal allergic rhinitis due to pollen  fluticasone (FLONASE) 50 MCG/ACT nasal spray    cetirizine (ZYRTEC) 10 MG tablet   2. Left ear impacted cerumen             Plan   1. Seasonal allergic rhinitis due to pollen: start daily zyrtec/flonase. Advised regular use of these medications in future around known times her symptoms are triggered. If symptoms worsen/do not improve she is to let me know. - fluticasone (FLONASE) 50 MCG/ACT nasal spray; 1 spray by Each Nostril route daily  Dispense: 1 Bottle; Refill: 2  - cetirizine (ZYRTEC) 10 MG tablet; Take 1 tablet by mouth daily  Dispense: 30 tablet; Refill: 0    2. Left ear impacted cerumen: irrigated in clinic    Return if symptoms worsen or fail to improve. OR sooner with questions, concerns, worsening symptoms      -Patient verbalized understanding and agreement to plan. Please note that this chart was generated using dragon dictation software. Although every effort was made to ensure the accuracy of this automated transcription, some errors in transcription may have occurred.

## 2021-06-30 ENCOUNTER — NURSE TRIAGE (OUTPATIENT)
Dept: OTHER | Facility: CLINIC | Age: 26
End: 2021-06-30

## 2021-06-30 NOTE — TELEPHONE ENCOUNTER
Received call from [facility] with Red Flag Complaint. Brief description of triage: Each time she gets her period her breast swell to twice the normal times, tender, heavy. This is the second time this has happened. Triage indicates for patient to be seen within 3 days     Care advice provided, patient verbalizes understanding; denies any other questions or concerns; instructed to call back for any new or worsening symptoms. Writer provided warm transfer to [facility] for appointment scheduling. Attention Provider: Thank you for allowing me to participate in the care of your patient. The patient was connected to triage in response to information provided to the [facility]. Please do not respond through this encounter as the response is not directed to a shared pool. Reason for Disposition   Patient wants to be seen    Answer Assessment - Initial Assessment Questions  1. SYMPTOM: \"What's the main symptom you're concerned about? \"  (e.g., lump, pain, rash, nipple discharge)      Swelling and pain     2. LOCATION: \"Where is the swelling  located? \"      Both breasts    3. ONSET: \"When did swelling  start? \"      First part of May 2021, comes and goes with her period     4. PRIOR HISTORY: \"Do you have any history of prior problems with your breasts? \" (e.g., lumps, cancer, fibrocystic breast disease)      Denies     5. CAUSE: \"What do you think is causing this symptom? \"      Does not know    6. OTHER SYMPTOMS: \"Do you have any other symptoms? \" (e.g., fever, breast pain, redness or rash, nipple discharge)      Swelling, tenderness     7. PREGNANCY-BREASTFEEDING: \"Is there any chance you are pregnant? \" \"When was your last menstrual period? \" \"Are you breastfeeding? \"      Denies, has her tubes tied    Protocols used: BREAST SYMPTOMS-ADULT-[place]

## 2021-07-01 ENCOUNTER — OFFICE VISIT (OUTPATIENT)
Dept: PRIMARY CARE CLINIC | Age: 26
End: 2021-07-01
Payer: COMMERCIAL

## 2021-07-01 VITALS
WEIGHT: 183.2 LBS | BODY MASS INDEX: 31.94 KG/M2 | RESPIRATION RATE: 16 BRPM | SYSTOLIC BLOOD PRESSURE: 112 MMHG | DIASTOLIC BLOOD PRESSURE: 75 MMHG | TEMPERATURE: 98.4 F | HEART RATE: 82 BPM | OXYGEN SATURATION: 98 %

## 2021-07-01 DIAGNOSIS — N64.4 BREAST TENDERNESS IN FEMALE: Primary | ICD-10-CM

## 2021-07-01 PROCEDURE — G8417 CALC BMI ABV UP PARAM F/U: HCPCS | Performed by: FAMILY MEDICINE

## 2021-07-01 PROCEDURE — 1036F TOBACCO NON-USER: CPT | Performed by: FAMILY MEDICINE

## 2021-07-01 PROCEDURE — 99212 OFFICE O/P EST SF 10 MIN: CPT | Performed by: FAMILY MEDICINE

## 2021-07-01 PROCEDURE — G8427 DOCREV CUR MEDS BY ELIG CLIN: HCPCS | Performed by: FAMILY MEDICINE

## 2021-07-01 ASSESSMENT — ENCOUNTER SYMPTOMS
DIARRHEA: 0
ABDOMINAL PAIN: 0
SHORTNESS OF BREATH: 0
BLOOD IN STOOL: 0
CONSTIPATION: 0

## 2021-07-01 NOTE — PROGRESS NOTES
2021     Francie Christensen (:  1995) is a 32 y.o. 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 She reported that she has had tube tied after the last pregnancy. Presented to the office complaining of breast tenderness for the past few days. She denies discoloration of the breast or lump or masses or any sign of infection- there is no warmth or erythema. She reported that she is almost at the middle of her menstrual cycle. Her last menstrual period was -. She denies any unusual vaginal bleeding. She was concern about birth control pills because of history of weight gain with the pills. She has no regular gynecological checkup. She denies chest pain or shortness of breath. Denies bowel or urinary disturbance. Review of Systems   Constitutional: Negative for activity change and appetite change. Eyes: Negative for visual disturbance. Respiratory: Negative for shortness of breath. Cardiovascular: Negative for chest pain and leg swelling. Gastrointestinal: Negative for abdominal pain, blood in stool, constipation and diarrhea. Genitourinary: Negative for difficulty urinating, frequency, hematuria, menstrual problem and urgency. Neurological: Negative for dizziness and syncope. Psychiatric/Behavioral: Negative for behavioral problems. Prior to Visit Medications    Medication Sig Taking? Authorizing Provider   fluticasone (FLONASE) 50 MCG/ACT nasal spray 1 spray by Each Nostril route daily Yes DieORTIZ Reyes        Social History     Tobacco Use    Smoking status: Never Smoker    Smokeless tobacco: Never Used   Substance Use Topics    Alcohol use:  Yes     Alcohol/week: 0.0 standard drinks     Comment: occasionally         Vitals:    21 0932   BP: 112/75   Pulse: 82   Resp: 16   Temp: 98.4 °F (36.9 °C)   TempSrc: Temporal   SpO2: 98%   Weight: 183 lb 3.2 oz (83.1 kg) Estimated body mass index is 31.94 kg/m² as calculated from the following:    Height as of 4/9/21: 5' 3.5\" (1.613 m). Weight as of this encounter: 183 lb 3.2 oz (83.1 kg). Physical Exam  Constitutional:       Appearance: She is well-developed. HENT:      Head: Normocephalic. Right Ear: External ear normal.      Nose: Nose normal.   Eyes:      Conjunctiva/sclera: Conjunctivae normal.      Pupils: Pupils are equal, round, and reactive to light. Neck:      Thyroid: No thyromegaly. Cardiovascular:      Rate and Rhythm: Normal rate and regular rhythm. Heart sounds: Normal heart sounds. No murmur heard. No friction rub. Pulmonary:      Effort: Pulmonary effort is normal.      Breath sounds: Normal breath sounds. Abdominal:      General: Bowel sounds are normal.      Palpations: Abdomen is soft. There is no mass. Musculoskeletal:         General: Normal range of motion. Cervical back: Normal range of motion and neck supple. Lymphadenopathy:      Cervical: No cervical adenopathy. Skin:     General: Skin is warm. Findings: No rash. Neurological:      Mental Status: She is alert and oriented to person, place, and time. ASSESSMENT/PLAN:  1. Breast tenderness in female  Will refer to gynecology for further evaluation and treatment. Meantime may take NSAID of choice or warm compress.   Avoid tight fitting bra  - Anaya Shell MD, Gynecology, [facility]      RT PRN F/U with her PCP    An electronic signature was used to authenticate this note.    --Marcia Vick MD on 7/1/2021 at 10:25 AM

## 2021-07-13 ENCOUNTER — TELEPHONE (OUTPATIENT)
Dept: PRIMARY CARE CLINIC | Age: 26
End: 2021-07-13

## 2021-07-13 NOTE — TELEPHONE ENCOUNTER
----- Message from Kimberly Barrientos sent at 7/13/2021  9:20 AM EDT -----  Subject: Referral Request    QUESTIONS   Reason for referral request? Pt needs an OBGYN as soon as possible for an   ongoing issue that has returned. Has the physician seen you for this condition before? Yes  Select a date? 2021-07-01  Select the physician (PCP or Specialist)? Mikaela Rudolph   Preferred Specialist (if applicable)? Do you already have an appointment scheduled? No  Additional Information for Provider? Pt had an appt scheduled with another   OBGYN that Dr. Nicole Echevarria referred her, and she tried calling several times   to reschedule but no one answered, the phone would just keep ringing. They   cancelled her appt and told her she couldn't come back. ---------------------------------------------------------------------------  --------------  Cristina BURK  What is the best way for the office to contact you? OK to leave message on   voicemail  Preferred Call Back Phone Number?  [phone]

## 2021-08-23 ENCOUNTER — TELEPHONE (OUTPATIENT)
Dept: PRIMARY CARE CLINIC | Age: 26
End: 2021-08-23

## 2021-08-23 DIAGNOSIS — Z02.1 PRE-EMPLOYMENT HEALTH SCREENING EXAMINATION: Primary | ICD-10-CM

## 2021-08-24 DIAGNOSIS — D64.9 NORMOCYTIC ANEMIA: Primary | ICD-10-CM

## 2021-08-26 LAB
QUANTI TB GOLD PLUS: NEGATIVE
QUANTI TB1 MINUS NIL: 0.14 IU/ML (ref 0–0.34)
QUANTI TB2 MINUS NIL: 0.22 IU/ML (ref 0–0.34)
QUANTIFERON MITOGEN: >10 IU/ML
QUANTIFERON NIL: 0.02 IU/ML

## 2021-09-07 ENCOUNTER — NURSE TRIAGE (OUTPATIENT)
Dept: OTHER | Facility: CLINIC | Age: 26
End: 2021-09-07

## 2021-09-07 ENCOUNTER — OFFICE VISIT (OUTPATIENT)
Dept: PRIMARY CARE CLINIC | Age: 26
End: 2021-09-07
Payer: COMMERCIAL

## 2021-09-07 VITALS
HEART RATE: 70 BPM | HEIGHT: 64 IN | SYSTOLIC BLOOD PRESSURE: 113 MMHG | TEMPERATURE: 97.7 F | WEIGHT: 180 LBS | DIASTOLIC BLOOD PRESSURE: 69 MMHG | BODY MASS INDEX: 30.73 KG/M2

## 2021-09-07 DIAGNOSIS — R11.11 NON-INTRACTABLE VOMITING WITHOUT NAUSEA, UNSPECIFIED VOMITING TYPE: Primary | ICD-10-CM

## 2021-09-07 LAB
CONTROL: NORMAL
PREGNANCY TEST URINE, POC: NEGATIVE

## 2021-09-07 PROCEDURE — 99213 OFFICE O/P EST LOW 20 MIN: CPT | Performed by: NURSE PRACTITIONER

## 2021-09-07 PROCEDURE — G8417 CALC BMI ABV UP PARAM F/U: HCPCS | Performed by: NURSE PRACTITIONER

## 2021-09-07 PROCEDURE — G8427 DOCREV CUR MEDS BY ELIG CLIN: HCPCS | Performed by: NURSE PRACTITIONER

## 2021-09-07 PROCEDURE — 81025 URINE PREGNANCY TEST: CPT | Performed by: NURSE PRACTITIONER

## 2021-09-07 PROCEDURE — 1036F TOBACCO NON-USER: CPT | Performed by: NURSE PRACTITIONER

## 2021-09-07 RX ORDER — ONDANSETRON 4 MG/1
4 TABLET, FILM COATED ORAL 3 TIMES DAILY PRN
Qty: 30 TABLET | Refills: 0 | Status: SHIPPED | OUTPATIENT
Start: 2021-09-07

## 2021-09-07 RX ORDER — PANTOPRAZOLE SODIUM 20 MG/1
20 TABLET, DELAYED RELEASE ORAL
Qty: 90 TABLET | Refills: 1 | Status: SHIPPED | OUTPATIENT
Start: 2021-09-07

## 2021-09-07 SDOH — ECONOMIC STABILITY: FOOD INSECURITY: WITHIN THE PAST 12 MONTHS, THE FOOD YOU BOUGHT JUST DIDN'T LAST AND YOU DIDN'T HAVE MONEY TO GET MORE.: NEVER TRUE

## 2021-09-07 SDOH — ECONOMIC STABILITY: FOOD INSECURITY: WITHIN THE PAST 12 MONTHS, YOU WORRIED THAT YOUR FOOD WOULD RUN OUT BEFORE YOU GOT MONEY TO BUY MORE.: NEVER TRUE

## 2021-09-07 ASSESSMENT — ENCOUNTER SYMPTOMS
CONSTIPATION: 0
ABDOMINAL PAIN: 0
COUGH: 0
NAUSEA: 1
SHORTNESS OF BREATH: 0
VOMITING: 1
DIARRHEA: 0

## 2021-09-07 ASSESSMENT — SOCIAL DETERMINANTS OF HEALTH (SDOH): HOW HARD IS IT FOR YOU TO PAY FOR THE VERY BASICS LIKE FOOD, HOUSING, MEDICAL CARE, AND HEATING?: NOT HARD AT ALL

## 2021-09-07 NOTE — PROGRESS NOTES
Subjective     CC:   Chief Complaint   Patient presents with    Emesis     states that this started on Saturday and it happened 2-3 moinutes after eating. Since then if she feels it coming she stops eating       HPI    Carolina Snowden is a 33 yo female, here for vomiting. Symptoms began 4 days ago. Reports she is eating and she vomited immediately after eating. Since that time she feels nauseous with anything that she eats. If she stops eating the nausea resolves. She has not had any further vomiting. She does not experience any nausea unless she is eating. She is able to drink water and other fluids without getting nauseous/vomiting. Reports normal fluid intake. Is having some lower abdominal cramping, however per her period tracker she is ovulating and she typically has cramping with ovulation. Otherwise she denies abdominal pain, denies been epigastric and right upper quadrant abdominal pain. Denies fever/chills, denies diarrhea/constipation. Has never had this before. Review of Systems   Constitutional: Negative for chills and fever. Respiratory: Negative for cough and shortness of breath. Cardiovascular: Negative for chest pain. Gastrointestinal: Positive for nausea and vomiting. Negative for abdominal pain, constipation and diarrhea. Genitourinary: Negative for difficulty urinating. Neurological: Negative for dizziness and syncope. All other systems reviewed and are negative. Objective   Vitals:    09/07/21 1114   BP: 113/69   Pulse: 70   Temp: 97.7 °F (36.5 °C)   TempSrc: Temporal   Weight: 180 lb (81.6 kg)   Height: 5' 3.5\" (1.613 m)     Body mass index is 31.39 kg/m². Wt Readings from Last 3 Encounters:   09/07/21 180 lb (81.6 kg)   07/01/21 183 lb 3.2 oz (83.1 kg)   04/09/21 180 lb (81.6 kg)     BP Readings from Last 3 Encounters:   09/07/21 113/69   07/01/21 112/75   04/09/21 115/68      Physical Exam  Vitals reviewed.    Constitutional:       General: She is not in acute distress. Appearance: Normal appearance. HENT:      Head: Normocephalic and atraumatic. Pulmonary:      Effort: Pulmonary effort is normal.   Abdominal:      General: Bowel sounds are normal. There is no distension. Palpations: Abdomen is soft. There is no mass. Tenderness: There is no abdominal tenderness. There is no guarding or rebound. Skin:     General: Skin is warm and dry. Neurological:      General: No focal deficit present. Mental Status: She is alert and oriented to person, place, and time. Psychiatric:         Mood and Affect: Mood normal.         Behavior: Behavior normal.         Thought Content: Thought content normal.         Judgment: Judgment normal.          Diagnosis Orders   1. Non-intractable vomiting without nausea, unspecified vomiting type  POCT urine pregnancy    RENAL FUNCTION PANEL    CBC WITH AUTO DIFFERENTIAL    LIPASE    AMYLASE    HEPATIC FUNCTION PANEL    ondansetron (ZOFRAN) 4 MG tablet    pantoprazole (PROTONIX) 20 MG tablet           Plan   1. Non-intractable vomiting without nausea, unspecified vomiting type: Exam unremarkable. Check labs as below. Start as needed Zofran for nausea. Start Protonix for possible GERD. Medication education provided. We will follow-up with labs. Patient was advised that if she were to develop any change in symptoms she needs to follow-up. - POCT urine pregnancy  - RENAL FUNCTION PANEL; Future  - CBC WITH AUTO DIFFERENTIAL; Future  - LIPASE; Future  - AMYLASE; Future  - HEPATIC FUNCTION PANEL; Future  - ondansetron (ZOFRAN) 4 MG tablet; Take 1 tablet by mouth 3 times daily as needed for Nausea or Vomiting  Dispense: 30 tablet; Refill: 0  - pantoprazole (PROTONIX) 20 MG tablet; Take 1 tablet by mouth every morning (before breakfast)  Dispense: 90 tablet; Refill: 1    No follow-ups on file.  OR sooner with questions, concerns, worsening symptoms      -Patient verbalized understanding and agreement to plan.    Please note that this chart was generated using dragon dictation software. Although every effort was made to ensure the accuracy of this automated transcription, some errors in transcription may have occurred.

## 2021-09-07 NOTE — TELEPHONE ENCOUNTER
instructed to call back for any new or worsening symptoms. Writer provided warm transfer to Priyanka Pickens at [facility] for appointment scheduling. Attention Provider: Thank you for allowing me to participate in the care of your patient. The patient was connected to triage in response to information provided to the ECC. Please do not respond through this encounter as the response is not directed to a shared pool.

## 2021-09-10 ENCOUNTER — TELEPHONE (OUTPATIENT)
Dept: PRIMARY CARE CLINIC | Age: 26
End: 2021-09-10

## 2021-09-10 RX ORDER — PERMETHRIN 50 MG/G
CREAM TOPICAL
Qty: 1 EACH | Refills: 0 | Status: SHIPPED | OUTPATIENT
Start: 2021-09-10

## 2021-09-10 NOTE — TELEPHONE ENCOUNTER
Permethrin sent to pharmacy. She will apply this and leave on for 8 to 14 hours before showering and off. Should not apply to her face. She should wash all of her linens and clothing in hot water and dry on high heat.

## 2021-09-14 ENCOUNTER — APPOINTMENT (OUTPATIENT)
Dept: GENERAL RADIOLOGY | Age: 26
End: 2021-09-14
Payer: COMMERCIAL

## 2021-09-14 LAB
A/G RATIO: 1.3 (ref 1.1–2.2)
ALBUMIN SERPL-MCNC: 4.4 G/DL (ref 3.4–5)
ALP BLD-CCNC: 57 U/L (ref 40–129)
ALT SERPL-CCNC: 25 U/L (ref 10–40)
ANION GAP SERPL CALCULATED.3IONS-SCNC: 11 MMOL/L (ref 3–16)
AST SERPL-CCNC: 25 U/L (ref 15–37)
BANDED NEUTROPHILS RELATIVE PERCENT: 6 % (ref 0–7)
BASOPHILS ABSOLUTE: 0.1 K/UL (ref 0–0.2)
BASOPHILS RELATIVE PERCENT: 2 %
BILIRUB SERPL-MCNC: 0.3 MG/DL (ref 0–1)
BUN BLDV-MCNC: 3 MG/DL (ref 7–20)
CALCIUM SERPL-MCNC: 9.3 MG/DL (ref 8.3–10.6)
CHLORIDE BLD-SCNC: 102 MMOL/L (ref 99–110)
CO2: 23 MMOL/L (ref 21–32)
CREAT SERPL-MCNC: 0.8 MG/DL (ref 0.6–1.1)
EOSINOPHILS ABSOLUTE: 0 K/UL (ref 0–0.6)
EOSINOPHILS RELATIVE PERCENT: 0 %
GFR AFRICAN AMERICAN: >60
GFR NON-AFRICAN AMERICAN: >60
GLOBULIN: 3.3 G/DL
GLUCOSE BLD-MCNC: 114 MG/DL (ref 70–99)
HCT VFR BLD CALC: 36.3 % (ref 36–48)
HEMOGLOBIN: 12.2 G/DL (ref 12–16)
LYMPHOCYTES ABSOLUTE: 0.7 K/UL (ref 1–5.1)
LYMPHOCYTES RELATIVE PERCENT: 13 %
MAGNESIUM: 1.7 MG/DL (ref 1.8–2.4)
MCH RBC QN AUTO: 27.8 PG (ref 26–34)
MCHC RBC AUTO-ENTMCNC: 33.7 G/DL (ref 31–36)
MCV RBC AUTO: 82.5 FL (ref 80–100)
MONOCYTES ABSOLUTE: 1.3 K/UL (ref 0–1.3)
MONOCYTES RELATIVE PERCENT: 23 %
NEUTROPHILS ABSOLUTE: 3.4 K/UL (ref 1.7–7.7)
NEUTROPHILS RELATIVE PERCENT: 56 %
PDW BLD-RTO: 13.5 % (ref 12.4–15.4)
PLATELET # BLD: 173 K/UL (ref 135–450)
PMV BLD AUTO: 9.3 FL (ref 5–10.5)
POTASSIUM REFLEX MAGNESIUM: 3.4 MMOL/L (ref 3.5–5.1)
RBC # BLD: 4.4 M/UL (ref 4–5.2)
RBC # BLD: NORMAL 10*6/UL
SODIUM BLD-SCNC: 136 MMOL/L (ref 136–145)
TOTAL PROTEIN: 7.7 G/DL (ref 6.4–8.2)
WBC # BLD: 5.5 K/UL (ref 4–11)

## 2021-09-14 PROCEDURE — 80053 COMPREHEN METABOLIC PANEL: CPT

## 2021-09-14 PROCEDURE — 99284 EMERGENCY DEPT VISIT MOD MDM: CPT

## 2021-09-14 PROCEDURE — 83735 ASSAY OF MAGNESIUM: CPT

## 2021-09-14 PROCEDURE — 85025 COMPLETE CBC W/AUTO DIFF WBC: CPT

## 2021-09-14 PROCEDURE — 71046 X-RAY EXAM CHEST 2 VIEWS: CPT

## 2021-09-14 RX ORDER — ACETAMINOPHEN 500 MG
1000 TABLET ORAL ONCE
Status: COMPLETED | OUTPATIENT
Start: 2021-09-14 | End: 2021-09-15

## 2021-09-14 ASSESSMENT — PAIN SCALES - GENERAL: PAINLEVEL_OUTOF10: 8

## 2021-09-14 ASSESSMENT — PAIN DESCRIPTION - LOCATION: LOCATION: HEAD

## 2021-09-14 ASSESSMENT — PAIN DESCRIPTION - DESCRIPTORS: DESCRIPTORS: SHARP

## 2021-09-15 ENCOUNTER — HOSPITAL ENCOUNTER (EMERGENCY)
Age: 26
Discharge: HOME | End: 2021-09-15
Attending: EMERGENCY MEDICINE
Payer: COMMERCIAL

## 2021-09-15 VITALS
HEART RATE: 85 BPM | DIASTOLIC BLOOD PRESSURE: 45 MMHG | BODY MASS INDEX: 30.26 KG/M2 | TEMPERATURE: 99.6 F | WEIGHT: 177.25 LBS | RESPIRATION RATE: 15 BRPM | OXYGEN SATURATION: 99 % | SYSTOLIC BLOOD PRESSURE: 102 MMHG | HEIGHT: 64 IN

## 2021-09-15 DIAGNOSIS — U07.1 COVID-19: Primary | ICD-10-CM

## 2021-09-15 LAB
LACTIC ACID: 0.8 MMOL/L (ref 0.4–2)
SARS-COV-2, NAAT: DETECTED
SARS-COV-2: DETECTED

## 2021-09-15 PROCEDURE — 6360000002 HC RX W HCPCS: Performed by: EMERGENCY MEDICINE

## 2021-09-15 PROCEDURE — U0005 INFEC AGEN DETEC AMPLI PROBE: HCPCS

## 2021-09-15 PROCEDURE — 2580000003 HC RX 258: Performed by: EMERGENCY MEDICINE

## 2021-09-15 PROCEDURE — 6370000000 HC RX 637 (ALT 250 FOR IP): Performed by: NURSE PRACTITIONER

## 2021-09-15 PROCEDURE — 87635 SARS-COV-2 COVID-19 AMP PRB: CPT

## 2021-09-15 PROCEDURE — 83605 ASSAY OF LACTIC ACID: CPT

## 2021-09-15 PROCEDURE — U0003 INFECTIOUS AGENT DETECTION BY NUCLEIC ACID (DNA OR RNA); SEVERE ACUTE RESPIRATORY SYNDROME CORONAVIRUS 2 (SARS-COV-2) (CORONAVIRUS DISEASE [COVID-19]), AMPLIFIED PROBE TECHNIQUE, MAKING USE OF HIGH THROUGHPUT TECHNOLOGIES AS DESCRIBED BY CMS-2020-01-R: HCPCS

## 2021-09-15 RX ORDER — 0.9 % SODIUM CHLORIDE 0.9 %
1000 INTRAVENOUS SOLUTION INTRAVENOUS ONCE
Status: COMPLETED | OUTPATIENT
Start: 2021-09-15 | End: 2021-09-15

## 2021-09-15 RX ORDER — MAGNESIUM SULFATE IN WATER 40 MG/ML
2000 INJECTION, SOLUTION INTRAVENOUS ONCE
Status: COMPLETED | OUTPATIENT
Start: 2021-09-15 | End: 2021-09-15

## 2021-09-15 RX ADMIN — SODIUM CHLORIDE 1000 ML: 9 INJECTION, SOLUTION INTRAVENOUS at 01:31

## 2021-09-15 RX ADMIN — ACETAMINOPHEN 1000 MG: 500 TABLET ORAL at 01:30

## 2021-09-15 RX ADMIN — MAGNESIUM SULFATE HEPTAHYDRATE 2000 MG: 40 INJECTION, SOLUTION INTRAVENOUS at 01:38

## 2021-09-15 ASSESSMENT — PAIN DESCRIPTION - ONSET: ONSET: ON-GOING

## 2021-09-15 ASSESSMENT — ENCOUNTER SYMPTOMS
EYE DISCHARGE: 0
COUGH: 0
VOMITING: 0
CONSTIPATION: 0
COLOR CHANGE: 0
ABDOMINAL PAIN: 0
SHORTNESS OF BREATH: 0
EYE ITCHING: 0

## 2021-09-15 ASSESSMENT — PAIN DESCRIPTION - DESCRIPTORS: DESCRIPTORS: ACHING

## 2021-09-15 ASSESSMENT — PAIN - FUNCTIONAL ASSESSMENT: PAIN_FUNCTIONAL_ASSESSMENT: ACTIVITIES ARE NOT PREVENTED

## 2021-09-15 ASSESSMENT — PAIN SCALES - GENERAL
PAINLEVEL_OUTOF10: 8
PAINLEVEL_OUTOF10: 6
PAINLEVEL_OUTOF10: 4

## 2021-09-15 ASSESSMENT — PAIN DESCRIPTION - PROGRESSION: CLINICAL_PROGRESSION: GRADUALLY IMPROVING

## 2021-09-15 ASSESSMENT — PAIN DESCRIPTION - FREQUENCY: FREQUENCY: CONTINUOUS

## 2021-09-15 ASSESSMENT — PAIN DESCRIPTION - PAIN TYPE: TYPE: ACUTE PAIN

## 2021-09-15 ASSESSMENT — PAIN DESCRIPTION - LOCATION: LOCATION: HEAD

## 2021-09-15 NOTE — ED PROVIDER NOTES
[address] [facility]      Pt Name: Ricarda Andre  MRN: [id number]  [place] 1995  Date of evaluation: 9/14/2021  Provider: Neil Mcburney, MD    CHIEF COMPLAINT       Chief Complaint   Patient presents with    Concern For COVID-19     grandfather is positive, patient has been experiencing fevers       HISTORY OF PRESENT ILLNESS    Ricarda Andre is a 32 y.o. female who presents to the emergency department with cough, congestion, fevers. Has been going on the last few days. Concern for Covid. Both her family members are positive for Covid currently. Positive for loss of smell and taste. No shortness of breath or chest pain. Never happened before. Started spontaneously. Waxing and waning symptoms. No other associated symptoms. Nursing Notes were reviewed. Including nursing noted for FM, Surgical History, Past Medical History, Social History, vitals, and allergies; agree with all. REVIEW OF SYSTEMS       Review of Systems   Constitutional: Positive for activity change, appetite change, chills, fatigue and fever. Negative for diaphoresis and unexpected weight change. HENT: Positive for congestion. Negative for dental problem. Eyes: Negative for discharge and itching. Respiratory: Negative for cough and shortness of breath. Cardiovascular: Negative for chest pain and leg swelling. Gastrointestinal: Negative for abdominal pain, constipation and vomiting. Endocrine: Negative for cold intolerance and heat intolerance. Genitourinary: Negative for vaginal bleeding, vaginal discharge and vaginal pain. Musculoskeletal: Negative for neck pain and neck stiffness. Skin: Negative for color change and pallor. Neurological: Negative for tremors and weakness. Psychiatric/Behavioral: Negative for agitation and behavioral problems. Except as noted above the remainder of the review of systems was reviewed and negative.      PAST MEDICAL HISTORY     Past Medical History:   Diagnosis Date    Counseling on other sexually transmitted diseases 2012    Elevated LFTs 13    viral negative    History of eclampsia 2014    Overview:  Eclampsia postpartum after G1 in 2014    Mid-back pain, acute 2014    Otitis media, chronic, bilateral 2012    PIH (pregnancy induced hypertension) 2014    Positive TB test     negative chest XR    Preeclampsia 2014    post partum       SURGICAL HISTORY       Past Surgical History:   Procedure Laterality Date    ABDOMINOPLASTY      ADENOIDECTOMY      CARPAL TUNNEL RELEASE Right      SECTION      two    DENTAL SURGERY      root canal    TONSILLECTOMY      TUBAL LIGATION         CURRENT MEDICATIONS       Previous Medications    ONDANSETRON (ZOFRAN) 4 MG TABLET    Take 1 tablet by mouth 3 times daily as needed for Nausea or Vomiting    PANTOPRAZOLE (PROTONIX) 20 MG TABLET    Take 1 tablet by mouth every morning (before breakfast)    PERMETHRIN (ELIMITE) 5 % CREAM    Apply topically, avoiding face. Leave on for 8 to 14 hours before showering.        ALLERGIES     Nickel    FAMILY HISTORY        Family History   Problem Relation Age of Onset    High Cholesterol Mother     Depression Mother     Arthritis Mother     Asthma Sister     Heart Failure Maternal Aunt     Arthritis Maternal Grandmother     High Blood Pressure Maternal Grandmother     Kidney Disease Maternal Grandmother     High Blood Pressure Maternal Grandfather     Vision Loss Maternal Grandfather         glaucoma    Heart Failure Maternal Aunt     Heart Disease Paternal Grandmother     Rheum Arthritis Neg Hx     Osteoarthritis Neg Hx     Breast Cancer Neg Hx     Cancer Neg Hx     Diabetes Neg Hx     Hypertension Neg Hx     Migraines Neg Hx     Ovarian Cancer Neg Hx     Rashes/Skin Problems Neg Hx     Seizures Neg Hx     Stroke Neg Hx     Thyroid Disease Neg Hx     Other Neg Hx 09/15/21 0130 09/14/21 2047 09/14/21 2047 09/14/21 2047 09/14/21 2047 09/14/21 2047 09/14/21 2047 09/14/21 2047   115/66 102.9 °F (39.4 °C) Oral 123 20 100 % 5' 3.5\" (1.613 m) 177 lb 4 oz (80.4 kg)       Physical Exam  Vitals and nursing note reviewed. Constitutional:       General: She is not in acute distress. Appearance: She is well-developed. She is not ill-appearing, toxic-appearing or diaphoretic. HENT:      Head: Normocephalic and atraumatic. Right Ear: External ear normal.      Left Ear: External ear normal.   Eyes:      General:         Right eye: No discharge. Left eye: No discharge. Conjunctiva/sclera: Conjunctivae normal.      Pupils: Pupils are equal, round, and reactive to light. Cardiovascular:      Rate and Rhythm: Normal rate and regular rhythm. Heart sounds: No murmur heard. Pulmonary:      Effort: Pulmonary effort is normal. No respiratory distress. Breath sounds: Normal breath sounds. No wheezing or rales. Abdominal:      General: Bowel sounds are normal. There is no distension. Palpations: Abdomen is soft. There is no mass. Tenderness: There is no abdominal tenderness. There is no guarding or rebound. Genitourinary:     Comments: Deferred  Musculoskeletal:         General: No deformity. Normal range of motion. Cervical back: Normal range of motion and neck supple. Skin:     General: Skin is warm. Findings: No erythema or rash. Neurological:      Mental Status: She is alert and oriented to person, place, and time. She is not disoriented. Cranial Nerves: No cranial nerve deficit. Motor: No atrophy or abnormal muscle tone. Coordination: Coordination normal.   Psychiatric:         Behavior: Behavior normal.         Thought Content:  Thought content normal.         DIAGNOSTIC RESULTS     RADIOLOGY:   Non-plain film images such as CT, Ultrasoundand MRI are read by the radiologist. Plain radiographic images are visualized and preliminarily interpreted by the emergency physician with the below findings:    Chest x-ray reassuring    ED BEDSIDE ULTRASOUND:   Performed by ED Physician - none    LABS:  Labs Reviewed   CBC WITH AUTO DIFFERENTIAL - Abnormal; Notable for the following components:       Result Value    Lymphocytes Absolute 0.7 (*)     All other components within normal limits    Narrative:     Performed at:  [address] [employer] [address]   Phone [phone]   COMPREHENSIVE METABOLIC PANEL W/ REFLEX TO MG FOR LOW K - Abnormal; Notable for the following components:    Potassium reflex Magnesium 3.4 (*)     Glucose 114 (*)     BUN 3 (*)     All other components within normal limits    Narrative:     Performed at:  [address] [employer] [address]   Phone [phone]   MAGNESIUM - Abnormal; Notable for the following components:    Magnesium 1.70 (*)     All other components within normal limits    Narrative:     Performed at:  [address] [employer] [address]   Phone [phone], RAPID   LACTIC ACID, PLASMA    Narrative:     Performed at:  [address] [employer] [address]   Phone [phone]       All other labs were withinnormal range or not returned as of this dictation. EMERGENCY DEPARTMENT COURSE and DIFFERENTIAL DIAGNOSIS/MDM:     PMH, Surgical Hx, FH, Social Hx reviewed by myself (ETOH usage, Tobacco usage, Drug usage reviewed by myself, no pertinent Hx)- No Pertinent Hx     Old records were reviewed by me     30-year-old with concern for clinical diagnosis of Covid. Covid swab pending. Discussed quarantining. Strict return precautions. Pulse oximeter for home. Patient verbalized understanding.     I estimate there is LOW risk for Sepsis, MI, Stroke, Tamponade, PTX, Toxicity or other life threatening etiology thus I consider the discharge disposition reasonable. The patient is at low risk for mortality based on demographic, history and clinical factors. Given the best available information and clinical assessment, I estimate the risk of hospitalization to be greater than risk of treatment at home. I have explained to the patient that the risk could rapidly change, given precautions for return and instructions. Explained to patient that the risk for mortality is low based on demographic, history and clinical factors. I discussed with patient the results of evaluation in the ED, diagnosis, care, and prognosis. The plan is to discharge to home. Patient is in agreement with plan and questions have been answered. I also discussed with patient the reasons which may require a return visit and the importance of follow-up care. The patient is well-appearing, nontoxic, and improved at the time of discharge. Patient agrees to call to arrange follow-up care as directed. Patient understands to return immediately for worsening/change in symptoms. CRITICAL CARE TIME   Total Critical Caretime was 21 minutes, excluding separately reportable procedures. There was a high probability of clinically significant/life threatening deterioration in the patient's condition which required my urgent intervention.         PROCEDURES:  Unlessotherwise noted below, none    FINAL IMPRESSION      1. COVID-19          DISPOSITION/PLAN   DISPOSITION Decision To Discharge 09/15/2021 03:19:08 AM    PATIENT REFERRED TO:  ORTIZ Rubio   [address]  [phone]    Call today        DISCHARGE MEDICATIONS:  New Prescriptions    No medications on file          (Please note that portions ofthis note were completed with a voice recognition program.  Efforts were made to edit the dictations but occasionally words are mis-transcribed.)    [facility] Odalys Hensley MD(electronically signed)  Attending Emergency Physician            Daniel Arredondo MD  09/15/21 4651

## 2021-09-15 NOTE — ED NOTES
Pt d/c to per MD order, A&Ox4, respirations easy, even and unlabored, no distress noted. IV d/c, no redness, swelling, or bruising and bleeding controlled with bandage. 0 Scripts provided. Pt educated on s/s of adverse/allergic reactions and when to seek additional medical treatment. All questions, needs and concerns addressed per this RN, denies further. Pt ambulated from dept with steady gait. Home by self.       Millie Minor RN  09/15/21 5056

## 2021-09-16 ENCOUNTER — CARE COORDINATION (OUTPATIENT)
Dept: CARE COORDINATION | Age: 26
End: 2021-09-16

## 2021-09-16 ENCOUNTER — TELEPHONE (OUTPATIENT)
Dept: PRIMARY CARE CLINIC | Age: 26
End: 2021-09-16

## 2021-09-16 NOTE — TELEPHONE ENCOUNTER
Looks like she was seen in the ER yesterday and diagnosed with Covid. I am assuming this message was from before she went to the ER.

## 2021-09-16 NOTE — RESULT ENCOUNTER NOTE
The patient was called for notification of a POSITIVE test result for COVID-19. The following information was given to the patient:    The COVID-19 test result was positive  Treatment of coronavirus does not require an antibiotic  Remain isolated for 10 days since symptoms first appeared AND At least 3 days have passed after recovery (Recovery is defined as resolution of fever without the use of fever-reducing medications with progressive improvement or resolution of other symptoms). Wash hands often with soap and water for at least 20 seconds or alternatively use hand  with at least 60% alcohol content  Cover coughs and sneezes  Wear a mask when around others if possible  Clean all \"high-touch\" surfaces every day, such as doorknobs and cellphones  Continually monitor symptoms. Contact a medical provider if symptoms are worsening, such as difficulty breathing. For additional information, please visit the Centers for Disease Control and Prevention ([employer] vida [employer] fe[employer] [email or web address].

## 2021-09-16 NOTE — TELEPHONE ENCOUNTER
----- Message from Pernell Fine sent at 9/14/2021  8:39 AM EDT -----  Subject: Appointment Request    Reason for Call: Urgent (Patient Request) No Script    QUESTIONS  Type of Appointment? Established Patient  Reason for appointment request? Available appointments did not meet   patient need  Additional Information for Provider? Pt called to schedule an appt with   their pcp due to their current symptoms including vomiting, nausea and   chills they were seen for last week has not yet improved. Pt states they   feel worse than before. Please advise.   ---------------------------------------------------------------------------  --------------  CALL BACK INFO  What is the best way for the office to contact you? OK to leave message on   voicemail  Preferred Call Back Phone Number? [phone]  ---------------------------------------------------------------------------  --------------  SCRIPT ANSWERS  Relationship to Patient? Self  (Is the patient requesting to see the provider for a procedure?)? No  (Is the patient requesting to see the provider urgently  today or   tomorrow. )? Yes  Have you been diagnosed with, awaiting test results for, or told that you   are suspected of having COVID-19 (Coronavirus)? (If patient has tested   negative or was tested as a requirement for work, school, or travel and   not based on symptoms, answer no)? No  Within the past two weeks have you developed any of the following symptoms   (answer no if symptoms have been present longer than 2 weeks or began   more than 2 weeks ago)? Fever or Chills, Cough, Shortness of breath or   difficulty breathing, Loss of taste or smell, Sore throat, Nasal   congestion, Sneezing or runny nose, Fatigue or generalized body aches   (answer no if pain is specific to a body part e.g. back pain), Diarrhea,   Headache?  Yes

## 2022-03-25 ENCOUNTER — TELEPHONE (OUTPATIENT)
Dept: INTERNAL MEDICINE CLINIC | Age: 27
End: 2022-03-25

## 2022-03-25 NOTE — TELEPHONE ENCOUNTER
Pt called. She said she has been feeling tired and lightheaded at times. she had labs drawn last week at a lab outside of [facility]. She was told that her Hemoglobin was abnormal.   She will call back with the lab result and would like Rebel Haji to advise. She is a Kristi pt.

## 2022-06-22 ENCOUNTER — OFFICE VISIT (OUTPATIENT)
Dept: ENT CLINIC | Age: 27
End: 2022-06-22
Payer: COMMERCIAL

## 2022-06-22 VITALS
DIASTOLIC BLOOD PRESSURE: 70 MMHG | WEIGHT: 179 LBS | BODY MASS INDEX: 30.56 KG/M2 | HEIGHT: 64 IN | HEART RATE: 66 BPM | SYSTOLIC BLOOD PRESSURE: 118 MMHG

## 2022-06-22 DIAGNOSIS — K11.20 SIALOADENITIS: Primary | ICD-10-CM

## 2022-06-22 PROCEDURE — 99204 OFFICE O/P NEW MOD 45 MIN: CPT | Performed by: OTOLARYNGOLOGY

## 2022-06-22 PROCEDURE — 1036F TOBACCO NON-USER: CPT | Performed by: OTOLARYNGOLOGY

## 2022-06-22 PROCEDURE — G8417 CALC BMI ABV UP PARAM F/U: HCPCS | Performed by: OTOLARYNGOLOGY

## 2022-06-22 PROCEDURE — G8427 DOCREV CUR MEDS BY ELIG CLIN: HCPCS | Performed by: OTOLARYNGOLOGY

## 2022-06-22 NOTE — PROGRESS NOTES
[facility]      Patient Name: Luis Carrion De La Liberté Record Number:  [id number]  Primary Care Physician:  Li Christine, ORTIZ  Date of Consultation: 6/22/2022    Chief Complaint: Left neck swelling        HISTORY OF PRESENT ILLNESS  Nico Cristobal is a(n) 32 y.o. female who presents for evaluation of left neck swelling. The patient says for the last 3 weeks she has been having intermittent swelling of the left neck when she eats. She says is not necessarily every time she eats, but happens quite a bit. It is a little bit uncomfortable. It goes back down after she eats. She has never had this happen before. She is not diabetic. She thinks that she drinks plenty of water. No history of rheumatologic disorders. She is not a smoker. REVIEW OF SYSTEMS  As above    PHYSICAL EXAM  GENERAL: No Acute Distress, Alert and Oriented, no Hoarseness, strong voice  EYES: EOMI, Anti-icteric  HENT:   Head: Normocephalic and atraumatic. Face:  Symmetric, facial nerve intact, no sinus tenderness  Right Ear: Normal external ear  Left Ear: Normal external ear  Mouth/Oral Cavity:  normal lips, Uvula is midline, no mucosal lesions, no trismus, clear saliva from the bilateral submandibular ducts. Oropharynx/Larynx:  normal oropharynx,  Nose:Normal external nasal appearance. NECK: The left submandibular gland feels a little bit more firm than the right. No other neck masses          ASSESSMENT/PLAN  1. Sialoadenitis  The patient's history is consistent with sialoadenitis. She does not really have a true infection just what sound like a buildup of saliva in the left submandibular gland when she eats. I would like get a CT scan to rule out a salivary stone. She has a salivary stone removal would be curative. She can follow-up with me after the CT scan  - CT SOFT TISSUE NECK W CONTRAST;  Future             I have performed a head and neck physical exam personally or was physically present during the key or critical portions of the service. This note was generated completely or in part utilizing Dragon dictation speech recognition software. Occasionally, words are mistranscribed and despite editing, the text may contain inaccuracies due to incorrect word recognition. If further clarification is needed please contact the office at [phone].

## 2022-06-29 ENCOUNTER — HOSPITAL ENCOUNTER (OUTPATIENT)
Dept: CT IMAGING | Age: 27
Discharge: HOME | End: 2022-06-29
Payer: COMMERCIAL

## 2022-06-29 ENCOUNTER — OFFICE VISIT (OUTPATIENT)
Dept: ENT CLINIC | Age: 27
End: 2022-06-29
Payer: COMMERCIAL

## 2022-06-29 VITALS
DIASTOLIC BLOOD PRESSURE: 72 MMHG | BODY MASS INDEX: 31.24 KG/M2 | WEIGHT: 183 LBS | SYSTOLIC BLOOD PRESSURE: 113 MMHG | HEART RATE: 70 BPM | HEIGHT: 64 IN

## 2022-06-29 DIAGNOSIS — K11.20 SIALOADENITIS: ICD-10-CM

## 2022-06-29 DIAGNOSIS — K11.20 SIALOADENITIS: Primary | ICD-10-CM

## 2022-06-29 PROCEDURE — 70491 CT SOFT TISSUE NECK W/DYE: CPT

## 2022-06-29 PROCEDURE — 6360000004 HC RX CONTRAST MEDICATION: Performed by: INTERNAL MEDICINE

## 2022-06-29 PROCEDURE — G8427 DOCREV CUR MEDS BY ELIG CLIN: HCPCS | Performed by: OTOLARYNGOLOGY

## 2022-06-29 PROCEDURE — 99213 OFFICE O/P EST LOW 20 MIN: CPT | Performed by: OTOLARYNGOLOGY

## 2022-06-29 PROCEDURE — G8417 CALC BMI ABV UP PARAM F/U: HCPCS | Performed by: OTOLARYNGOLOGY

## 2022-06-29 PROCEDURE — 1036F TOBACCO NON-USER: CPT | Performed by: OTOLARYNGOLOGY

## 2022-06-29 RX ADMIN — IOPAMIDOL 75 ML: 755 INJECTION, SOLUTION INTRAVENOUS at 09:32

## 2022-06-29 NOTE — PROGRESS NOTES
[address] [facility]  Follow up      Patient Name: Devonte Hancock  Medical Record Number:  [id number]  Primary Care Physician:  ORTIZ Nunez  Date of Consultation: 6/29/2022    Chief Complaint: Left neck swelling        Interval History    Patient is following up for her intermittent left neck swelling. I felt as though this was likely the submandibular gland. Given the intermittent nature of it when she ate I was worried perhaps she had a salivary stone or something else. I got a CT scan to rule this out. No significant changes since I saw her on June 22. REVIEW OF SYSTEMS    As above  PHYSICAL EXAM  GENERAL: No Acute Distress, Alert and Oriented, no Hoarseness, strong voice  EYES: EOMI, Anti-icteric  HENT:   Head: Normocephalic and atraumatic. Face:  Symmetric, facial nerve intact, no sinus tenderness  Right Ear: Normal external ear, normal external auditory canal, intact tympanic membrane with normal mobility and aerated middle ear  Left Ear: Normal external ear, normal external auditory canal, intact tympanic membrane with normal mobility and aerated middle ear  Mouth/Oral Cavity:  normal lips, Uvula is midline, no mucosal lesions, no trismus  Oropharynx/Larynx:  normal oropharynx  Nose:Normal external nasal appearance. NECK: Stable mild asymmetry of the submandibular glands with the left being more prominent than the          RADIOLOGY  Summary of findings:  I reviewed the patient's CT scan of the neck with contrast.  She does have some asymmetry of the submandibular glands with a little more prominence and lateral extension of the left submandibular gland compared to the right. There is not appear to be any mass or lesion. I do not appreciate a salivary stone        ASSESSMENT/PLAN  1. Sialoadenitis  I do not see an obvious salivary stone. There is a bit of asymmetry, but nothing concerning.   For now I would recommend conservative measures with increasing her hydration and massages. I told her to keep a journal of whenever it swelled up and exactly what she ate. I want to see her in 6 months to see how she is doing. If she feels that it swells up and does not go back down or gets worse I would like to see her sooner. I have performed a head and neck physical exam personally or was physically present during the key or critical portions of the service. This note was generated completely or in part utilizing Dragon dictation speech recognition software. Occasionally, words are mistranscribed and despite editing, the text may contain inaccuracies due to incorrect word recognition. If further clarification is needed please contact the office at [phone].

## 2022-07-27 ENCOUNTER — TELEPHONE (OUTPATIENT)
Dept: ENT CLINIC | Age: 27
End: 2022-07-27

## 2022-07-27 NOTE — TELEPHONE ENCOUNTER
I do not think that a CT scan after she eats would make any difference. It would not show a salivary stone or anything more clearly. I would continue to do conservative measures. I probably would not want to excise a submandibular gland unless this gets significantly worse. That would be an option long-term.

## 2022-07-27 NOTE — TELEPHONE ENCOUNTER
Called pt and gave her this info and she said ok she was getting  in Sept and she really didn't want to do anything at this time .  She will watch her salt intake on what she eats

## 2022-07-27 NOTE — TELEPHONE ENCOUNTER
Pt seen for neck swelling (dx sialoadenitis) on 6/29/22. She is calling today saying her symptoms continue. She thinks it happens more when she eats something with a lot of salt. She wonders if the CT should be done after she eats something? The last CT didn't show anything but she had not eaten. She says it \"bulges out\" after she eats. She says she is staying hydrated and that she massages it but that doesn't really help. Please call to advise.

## 2023-02-13 ENCOUNTER — TELEPHONE (OUTPATIENT)
Dept: ENT CLINIC | Age: 28
End: 2023-02-13

## 2023-02-13 NOTE — TELEPHONE ENCOUNTER
Pt was seen 6/29/22 for neck swelling; she states yesterday she felt something under her tongue and thinks it was a salivary stone. She was able to get it out herself but says today it is sore. She doesn't know if Dr Estrellita Mendoza thinks she should come in to see him? Please call to advise.

## 2023-02-13 NOTE — TELEPHONE ENCOUNTER
I had actually recommended seeing her in 6 months anyway.   Lets try to get her in in the next couple of weeks

## 2023-02-22 ENCOUNTER — OFFICE VISIT (OUTPATIENT)
Dept: ENT CLINIC | Age: 28
End: 2023-02-22
Payer: COMMERCIAL

## 2023-02-22 VITALS
HEART RATE: 55 BPM | BODY MASS INDEX: 32.27 KG/M2 | OXYGEN SATURATION: 98 % | WEIGHT: 189 LBS | DIASTOLIC BLOOD PRESSURE: 69 MMHG | HEIGHT: 64 IN | SYSTOLIC BLOOD PRESSURE: 106 MMHG

## 2023-02-22 DIAGNOSIS — K11.20 SIALOADENITIS: Primary | ICD-10-CM

## 2023-02-22 PROCEDURE — G8417 CALC BMI ABV UP PARAM F/U: HCPCS | Performed by: OTOLARYNGOLOGY

## 2023-02-22 PROCEDURE — 1036F TOBACCO NON-USER: CPT | Performed by: OTOLARYNGOLOGY

## 2023-02-22 PROCEDURE — 99213 OFFICE O/P EST LOW 20 MIN: CPT | Performed by: OTOLARYNGOLOGY

## 2023-02-22 PROCEDURE — G8484 FLU IMMUNIZE NO ADMIN: HCPCS | Performed by: OTOLARYNGOLOGY

## 2023-02-22 PROCEDURE — G8427 DOCREV CUR MEDS BY ELIG CLIN: HCPCS | Performed by: OTOLARYNGOLOGY

## 2023-02-22 NOTE — PROGRESS NOTES
[address] & NECK SURGERY  Follow up      Patient Name: Carlita Browning  Medical Record Number:  [id number]  Primary Care Physician:  ORTIZ Amin  Date of Consultation: 2/22/2023    Chief Complaint: Left neck swelling        Interval History    Patient is following up for her left neck swelling. I saw her last in June 2022. She was having some intermittent swelling of the left side of her neck and her exam was consistent with a sialoadenitis. The patient said that she was having some swelling and felt a small hard lesion come out from under her tongue. She said it was a bit like a grain of sand. She actually feels as though some of her swelling has been better since then          REVIEW OF SYSTEMS  As above    PHYSICAL EXAM  GENERAL: No Acute Distress, Alert and Oriented, no Hoarseness, strong voice  EYES: EOMI, Anti-icteric  HENT:   Head: Normocephalic and atraumatic. Face:  Symmetric, facial nerve intact, no sinus tenderness  Right Ear: Normal external ear, normal external auditory canal, intact tympanic membrane with normal mobility and aerated middle ear  Left Ear: Normal external ear, normal external auditory canal, intact tympanic membrane with normal mobility and aerated middle ear  Mouth/Oral Cavity: Excellent salivary flow from bilateral submandibular ducts  Oropharynx/Larynx:  normal oropharynx  Nose:Normal external nasal appearance. NECK: Palpation of the submandibular glands suggest that they are normal.          ASSESSMENT/PLAN  1. Sialoadenitis  It sounds as though the patient may have passed a small salivary stone. This may have been causing her problems all along. I told her to follow-up with me if her symptoms recur. I have performed a head and neck physical exam personally or was physically present during the key or critical portions of the service.     This note was generated completely or in part utilizing Dragon dictation speech recognition software. Occasionally, words are mistranscribed and despite editing, the text may contain inaccuracies due to incorrect word recognition. If further clarification is needed please contact the office at [phone].

## 2023-03-15 ENCOUNTER — HOSPITAL ENCOUNTER (EMERGENCY)
Age: 28
Discharge: HOME | End: 2023-03-15
Attending: EMERGENCY MEDICINE
Payer: COMMERCIAL

## 2023-03-15 VITALS
TEMPERATURE: 98.6 F | RESPIRATION RATE: 16 BRPM | WEIGHT: 178 LBS | HEIGHT: 64 IN | SYSTOLIC BLOOD PRESSURE: 115 MMHG | HEART RATE: 83 BPM | BODY MASS INDEX: 30.39 KG/M2 | OXYGEN SATURATION: 98 % | DIASTOLIC BLOOD PRESSURE: 67 MMHG

## 2023-03-15 DIAGNOSIS — J10.1 INFLUENZA A: Primary | ICD-10-CM

## 2023-03-15 LAB
FLUAV RNA UPPER RESP QL NAA+PROBE: POSITIVE
FLUBV AG NPH QL: NEGATIVE
SARS-COV-2 RDRP RESP QL NAA+PROBE: NOT DETECTED

## 2023-03-15 PROCEDURE — 6370000000 HC RX 637 (ALT 250 FOR IP): Performed by: EMERGENCY MEDICINE

## 2023-03-15 PROCEDURE — 99283 EMERGENCY DEPT VISIT LOW MDM: CPT

## 2023-03-15 PROCEDURE — 87635 SARS-COV-2 COVID-19 AMP PRB: CPT

## 2023-03-15 PROCEDURE — 87804 INFLUENZA ASSAY W/OPTIC: CPT

## 2023-03-15 RX ORDER — ACETAMINOPHEN 500 MG
1000 TABLET ORAL ONCE
Status: COMPLETED | OUTPATIENT
Start: 2023-03-15 | End: 2023-03-15

## 2023-03-15 RX ORDER — ONDANSETRON 4 MG/1
4 TABLET, ORALLY DISINTEGRATING ORAL 3 TIMES DAILY PRN
Qty: 21 TABLET | Refills: 0 | Status: SHIPPED | OUTPATIENT
Start: 2023-03-15

## 2023-03-15 RX ORDER — BENZONATATE 100 MG/1
100 CAPSULE ORAL 2 TIMES DAILY PRN
Qty: 20 CAPSULE | Refills: 0 | Status: SHIPPED | OUTPATIENT
Start: 2023-03-15 | End: 2023-03-22

## 2023-03-15 RX ORDER — OSELTAMIVIR PHOSPHATE 75 MG/1
75 CAPSULE ORAL 2 TIMES DAILY
Qty: 10 CAPSULE | Refills: 0 | Status: SHIPPED | OUTPATIENT
Start: 2023-03-15 | End: 2023-03-20

## 2023-03-15 RX ORDER — IBUPROFEN 200 MG
600 TABLET ORAL EVERY 8 HOURS PRN
Qty: 60 TABLET | Refills: 0 | Status: SHIPPED | OUTPATIENT
Start: 2023-03-15

## 2023-03-15 RX ADMIN — ACETAMINOPHEN 1000 MG: 500 TABLET ORAL at 19:01

## 2023-03-15 ASSESSMENT — PAIN - FUNCTIONAL ASSESSMENT: PAIN_FUNCTIONAL_ASSESSMENT: NONE - DENIES PAIN

## 2023-03-16 NOTE — ED NOTES
Discharge instructions given, patient acknowledged understanding, patient ambulated out of ed upon discharge      Lyubov Lenz RN  03/15/23 2023

## 2024-10-02 ENCOUNTER — OFFICE VISIT (OUTPATIENT)
Dept: PRIMARY CARE CLINIC | Age: 29
End: 2024-10-02

## 2024-10-02 VITALS
WEIGHT: 183.4 LBS | HEIGHT: 64 IN | DIASTOLIC BLOOD PRESSURE: 74 MMHG | TEMPERATURE: 98.2 F | HEART RATE: 85 BPM | SYSTOLIC BLOOD PRESSURE: 109 MMHG | BODY MASS INDEX: 31.31 KG/M2 | OXYGEN SATURATION: 97 %

## 2024-10-02 DIAGNOSIS — E61.1 IRON DEFICIENCY: ICD-10-CM

## 2024-10-02 DIAGNOSIS — N94.6 MENSTRUAL CRAMPS: Primary | ICD-10-CM

## 2024-10-02 DIAGNOSIS — B99.9 RECURRENT INFECTIONS: ICD-10-CM

## 2024-10-02 SDOH — ECONOMIC STABILITY: INCOME INSECURITY: HOW HARD IS IT FOR YOU TO PAY FOR THE VERY BASICS LIKE FOOD, HOUSING, MEDICAL CARE, AND HEATING?: NOT VERY HARD

## 2024-10-02 SDOH — HEALTH STABILITY: PHYSICAL HEALTH: ON AVERAGE, HOW MANY MINUTES DO YOU ENGAGE IN EXERCISE AT THIS LEVEL?: 20 MIN

## 2024-10-02 SDOH — ECONOMIC STABILITY: FOOD INSECURITY: WITHIN THE PAST 12 MONTHS, YOU WORRIED THAT YOUR FOOD WOULD RUN OUT BEFORE YOU GOT MONEY TO BUY MORE.: NEVER TRUE

## 2024-10-02 SDOH — ECONOMIC STABILITY: FOOD INSECURITY: WITHIN THE PAST 12 MONTHS, THE FOOD YOU BOUGHT JUST DIDN'T LAST AND YOU DIDN'T HAVE MONEY TO GET MORE.: NEVER TRUE

## 2024-10-02 SDOH — HEALTH STABILITY: PHYSICAL HEALTH: ON AVERAGE, HOW MANY DAYS PER WEEK DO YOU ENGAGE IN MODERATE TO STRENUOUS EXERCISE (LIKE A BRISK WALK)?: 2 DAYS

## 2024-10-02 ASSESSMENT — PATIENT HEALTH QUESTIONNAIRE - PHQ9
SUM OF ALL RESPONSES TO PHQ QUESTIONS 1-9: 0
2. FEELING DOWN, DEPRESSED OR HOPELESS: NOT AT ALL
SUM OF ALL RESPONSES TO PHQ9 QUESTIONS 1 & 2: 0
1. LITTLE INTEREST OR PLEASURE IN DOING THINGS: NOT AT ALL

## 2024-10-02 NOTE — PROGRESS NOTES
Standing Status:   Future     Number of Occurrences:   1     Standing Expiration Date:   10/2/2025    Comprehensive Metabolic Panel     Standing Status:   Future     Number of Occurrences:   1     Standing Expiration Date:   10/2/2025    Hemoglobin A1C     Standing Status:   Future     Number of Occurrences:   1     Standing Expiration Date:   10/2/2025    Hepatic Function Panel     Standing Status:   Future     Number of Occurrences:   1     Standing Expiration Date:   10/2/2025    Iron and TIBC     Standing Status:   Future     Number of Occurrences:   1     Standing Expiration Date:   10/2/2025    Ferritin     Standing Status:   Future     Number of Occurrences:   1     Standing Expiration Date:   10/2/2025    Lipid Panel     Standing Status:   Future     Number of Occurrences:   1     Standing Expiration Date:   10/2/2025    Vitamin D 25 Hydroxy     Standing Status:   Future     Number of Occurrences:   1     Standing Expiration Date:   10/2/2025    Vitamin B12 & Folate     Standing Status:   Future     Number of Occurrences:   1     Standing Expiration Date:   10/2/2025    TSH with Reflex to FT4     Standing Status:   Future     Number of Occurrences:   1     Standing Expiration Date:   10/2/2025       Return in about 1 year (around 10/2/2025) for well adult .        Dr. Casandra Lockett D.O.  - Family Medicine and [facility]        Usual doctor's hours are:       Monday 7:00 am to 5:30 pm  Wednesday 7:00 am to 4:30 pm  Thursday 7:00 am to 4:30 pm  Friday 7:00 am to 3:30 pm  Saturdays, Sundays, and after hours: E-Visits are available    We observe most federal holidays and Good Friday.    We ask that you only contact the office one time per issue or question, and please allow one full business day for a call back. Calling us back multiple times keeps us from being able to complete the work efficiently for you and our other patients.    For medication renewals, please call your pharmacist to

## 2024-10-03 DIAGNOSIS — E61.1 IRON DEFICIENCY: ICD-10-CM

## 2024-10-03 DIAGNOSIS — N94.6 MENSTRUAL CRAMPS: ICD-10-CM

## 2024-10-03 DIAGNOSIS — B99.9 RECURRENT INFECTIONS: ICD-10-CM

## 2024-10-03 LAB
25(OH)D3 SERPL-MCNC: 11.6 NG/ML
ALBUMIN SERPL-MCNC: 4.1 G/DL (ref 3.4–5)
ALBUMIN/GLOB SERPL: 1.5 {RATIO} (ref 1.1–2.2)
ALP SERPL-CCNC: 66 U/L (ref 40–129)
ALT SERPL-CCNC: 20 U/L (ref 10–40)
ANION GAP SERPL CALCULATED.3IONS-SCNC: 11 MMOL/L (ref 3–16)
AST SERPL-CCNC: 18 U/L (ref 15–37)
BASOPHILS # BLD: 0 K/UL (ref 0–0.2)
BASOPHILS NFR BLD: 0.4 %
BILIRUB DIRECT SERPL-MCNC: <0.1 MG/DL (ref 0–0.3)
BILIRUB INDIRECT SERPL-MCNC: NORMAL MG/DL (ref 0–1)
BILIRUB SERPL-MCNC: <0.2 MG/DL (ref 0–1)
BUN SERPL-MCNC: 7 MG/DL (ref 7–20)
CALCIUM SERPL-MCNC: 9.4 MG/DL (ref 8.3–10.6)
CHLORIDE SERPL-SCNC: 103 MMOL/L (ref 99–110)
CHOLEST SERPL-MCNC: 147 MG/DL (ref 0–199)
CO2 SERPL-SCNC: 25 MMOL/L (ref 21–32)
CREAT SERPL-MCNC: <0.5 MG/DL (ref 0.6–1.1)
DEPRECATED RDW RBC AUTO: 12.7 % (ref 12.4–15.4)
EOSINOPHIL # BLD: 0.1 K/UL (ref 0–0.6)
EOSINOPHIL NFR BLD: 1.4 %
EST. AVERAGE GLUCOSE BLD GHB EST-MCNC: 96.8 MG/DL
FERRITIN SERPL IA-MCNC: 59.6 NG/ML (ref 15–150)
FOLATE SERPL-MCNC: 5.82 NG/ML (ref 4.78–24.2)
GFR SERPLBLD CREATININE-BSD FMLA CKD-EPI: >90 ML/MIN/{1.73_M2}
GLUCOSE SERPL-MCNC: 81 MG/DL (ref 70–99)
HBA1C MFR BLD: 5 %
HCT VFR BLD AUTO: 39.3 % (ref 36–48)
HDLC SERPL-MCNC: 43 MG/DL (ref 40–60)
HGB BLD-MCNC: 13.5 G/DL (ref 12–16)
IRON SATN MFR SERPL: 14 % (ref 15–50)
IRON SERPL-MCNC: 41 UG/DL (ref 37–145)
LDLC SERPL CALC-MCNC: 87 MG/DL
LYMPHOCYTES # BLD: 1.3 K/UL (ref 1–5.1)
LYMPHOCYTES NFR BLD: 22.6 %
MCH RBC QN AUTO: 28.8 PG (ref 26–34)
MCHC RBC AUTO-ENTMCNC: 34.3 G/DL (ref 31–36)
MCV RBC AUTO: 83.9 FL (ref 80–100)
MONOCYTES # BLD: 0.6 K/UL (ref 0–1.3)
MONOCYTES NFR BLD: 10.3 %
NEUTROPHILS # BLD: 3.7 K/UL (ref 1.7–7.7)
NEUTROPHILS NFR BLD: 65.3 %
PLATELET # BLD AUTO: 244 K/UL (ref 135–450)
PMV BLD AUTO: 9.6 FL (ref 5–10.5)
POTASSIUM SERPL-SCNC: 4.1 MMOL/L (ref 3.5–5.1)
PROT SERPL-MCNC: 6.8 G/DL (ref 6.4–8.2)
RBC # BLD AUTO: 4.69 M/UL (ref 4–5.2)
SODIUM SERPL-SCNC: 139 MMOL/L (ref 136–145)
TIBC SERPL-MCNC: 286 UG/DL (ref 260–445)
TRIGL SERPL-MCNC: 84 MG/DL (ref 0–150)
TSH SERPL DL<=0.005 MIU/L-ACNC: 0.55 UIU/ML (ref 0.27–4.2)
VIT B12 SERPL-MCNC: 447 PG/ML (ref 211–911)
VLDLC SERPL CALC-MCNC: 17 MG/DL
WBC # BLD AUTO: 5.7 K/UL (ref 4–11)

## 2024-10-04 DIAGNOSIS — E55.9 VITAMIN D DEFICIENCY: ICD-10-CM

## 2024-10-04 DIAGNOSIS — E61.1 IRON DEFICIENCY: Primary | ICD-10-CM

## 2024-10-04 RX ORDER — CHOLECALCIFEROL (VITAMIN D3) 10(400)/ML
160 DROPS ORAL
Qty: 90 TABLET | Refills: 0 | Status: SHIPPED | OUTPATIENT
Start: 2024-10-04 | End: 2025-01-02

## 2024-10-04 RX ORDER — ERGOCALCIFEROL 1.25 MG/1
50000 CAPSULE, LIQUID FILLED ORAL WEEKLY
Qty: 12 CAPSULE | Refills: 1 | Status: SHIPPED | OUTPATIENT
Start: 2024-10-04

## 2024-10-04 ASSESSMENT — ENCOUNTER SYMPTOMS
NAUSEA: 0
DIARRHEA: 0
COUGH: 0
SHORTNESS OF BREATH: 0
CONSTIPATION: 0
VOMITING: 0

## 2024-10-07 ENCOUNTER — PATIENT MESSAGE (OUTPATIENT)
Dept: PRIMARY CARE CLINIC | Age: 29
End: 2024-10-07

## 2024-10-08 ENCOUNTER — OFFICE VISIT (OUTPATIENT)
Dept: PRIMARY CARE CLINIC | Age: 29
End: 2024-10-08
Payer: COMMERCIAL

## 2024-10-08 VITALS
HEIGHT: 64 IN | SYSTOLIC BLOOD PRESSURE: 112 MMHG | WEIGHT: 186.8 LBS | RESPIRATION RATE: 16 BRPM | BODY MASS INDEX: 31.89 KG/M2 | DIASTOLIC BLOOD PRESSURE: 72 MMHG | TEMPERATURE: 98.4 F | HEART RATE: 68 BPM | OXYGEN SATURATION: 98 %

## 2024-10-08 DIAGNOSIS — H65.01 NON-RECURRENT ACUTE SEROUS OTITIS MEDIA OF RIGHT EAR: Primary | ICD-10-CM

## 2024-10-08 DIAGNOSIS — J20.9 ACUTE BRONCHITIS, UNSPECIFIED ORGANISM: ICD-10-CM

## 2024-10-08 PROCEDURE — G8484 FLU IMMUNIZE NO ADMIN: HCPCS | Performed by: INTERNAL MEDICINE

## 2024-10-08 PROCEDURE — G8427 DOCREV CUR MEDS BY ELIG CLIN: HCPCS | Performed by: INTERNAL MEDICINE

## 2024-10-08 PROCEDURE — 1036F TOBACCO NON-USER: CPT | Performed by: INTERNAL MEDICINE

## 2024-10-08 PROCEDURE — G8417 CALC BMI ABV UP PARAM F/U: HCPCS | Performed by: INTERNAL MEDICINE

## 2024-10-08 PROCEDURE — 99214 OFFICE O/P EST MOD 30 MIN: CPT | Performed by: INTERNAL MEDICINE

## 2024-10-08 RX ORDER — CETIRIZINE HYDROCHLORIDE 10 MG/1
10 TABLET ORAL DAILY
Qty: 90 TABLET | Refills: 0 | Status: SHIPPED | OUTPATIENT
Start: 2024-10-08

## 2024-10-08 RX ORDER — FLUTICASONE PROPIONATE 50 MCG
1 SPRAY, SUSPENSION (ML) NASAL DAILY
Qty: 16 G | Refills: 2 | Status: SHIPPED | OUTPATIENT
Start: 2024-10-08

## 2024-10-08 RX ORDER — AZITHROMYCIN 250 MG/1
TABLET, FILM COATED ORAL
Qty: 6 TABLET | Refills: 0 | Status: SHIPPED | OUTPATIENT
Start: 2024-10-08 | End: 2024-10-18

## 2024-10-08 SDOH — ECONOMIC STABILITY: FOOD INSECURITY: WITHIN THE PAST 12 MONTHS, YOU WORRIED THAT YOUR FOOD WOULD RUN OUT BEFORE YOU GOT MONEY TO BUY MORE.: NEVER TRUE

## 2024-10-08 SDOH — ECONOMIC STABILITY: FOOD INSECURITY: WITHIN THE PAST 12 MONTHS, THE FOOD YOU BOUGHT JUST DIDN'T LAST AND YOU DIDN'T HAVE MONEY TO GET MORE.: NEVER TRUE

## 2024-10-08 SDOH — ECONOMIC STABILITY: INCOME INSECURITY: HOW HARD IS IT FOR YOU TO PAY FOR THE VERY BASICS LIKE FOOD, HOUSING, MEDICAL CARE, AND HEATING?: NOT HARD AT ALL

## 2024-10-08 ASSESSMENT — PATIENT HEALTH QUESTIONNAIRE - PHQ9
SUM OF ALL RESPONSES TO PHQ QUESTIONS 1-9: 0
1. LITTLE INTEREST OR PLEASURE IN DOING THINGS: NOT AT ALL
2. FEELING DOWN, DEPRESSED OR HOPELESS: NOT AT ALL
SUM OF ALL RESPONSES TO PHQ QUESTIONS 1-9: 0
SUM OF ALL RESPONSES TO PHQ9 QUESTIONS 1 & 2: 0
SUM OF ALL RESPONSES TO PHQ QUESTIONS 1-9: 0
SUM OF ALL RESPONSES TO PHQ QUESTIONS 1-9: 0

## 2024-10-08 NOTE — PROGRESS NOTES
Cervical back: Normal range of motion and neck supple.      Right lower leg: No edema.      Left lower leg: No edema.   Skin:     General: Skin is warm and dry.   Neurological:      General: No focal deficit present.      Mental Status: She is alert and oriented to person, place, and time.   Psychiatric:         Mood and Affect: Mood normal.         Behavior: Behavior normal.              An electronic signature was used to authenticate this note.    --OCTAVIO TEJADA MD

## 2024-10-14 ASSESSMENT — ENCOUNTER SYMPTOMS
WHEEZING: 0
HEMOPTYSIS: 0
SHORTNESS OF BREATH: 0
COUGH: 1
HEARTBURN: 0
RHINORRHEA: 0

## 2024-11-21 ENCOUNTER — OFFICE VISIT (OUTPATIENT)
Dept: PRIMARY CARE CLINIC | Age: 29
End: 2024-11-21
Payer: COMMERCIAL

## 2024-11-21 VITALS
DIASTOLIC BLOOD PRESSURE: 72 MMHG | HEART RATE: 76 BPM | SYSTOLIC BLOOD PRESSURE: 116 MMHG | OXYGEN SATURATION: 98 % | WEIGHT: 181 LBS | BODY MASS INDEX: 30.9 KG/M2 | TEMPERATURE: 97.4 F | HEIGHT: 64 IN

## 2024-11-21 DIAGNOSIS — Z00.00 ENCOUNTER FOR WELL ADULT EXAM WITHOUT ABNORMAL FINDINGS: Primary | ICD-10-CM

## 2024-11-21 PROBLEM — Z86.19 HISTORY OF SEXUALLY TRANSMITTED DISEASE: Status: RESOLVED | Noted: 2017-09-14 | Resolved: 2024-11-21

## 2024-11-21 PROBLEM — O34.219 PREVIOUS CESAREAN SECTION COMPLICATING PREGNANCY: Status: RESOLVED | Noted: 2017-09-14 | Resolved: 2024-11-21

## 2024-11-21 PROBLEM — O09.91 ENCOUNTER FOR SUPERVISION OF HIGH RISK PREGNANCY IN FIRST TRIMESTER, ANTEPARTUM: Status: RESOLVED | Noted: 2017-09-14 | Resolved: 2024-11-21

## 2024-11-21 PROCEDURE — 99395 PREV VISIT EST AGE 18-39: CPT | Performed by: FAMILY MEDICINE

## 2024-11-21 PROCEDURE — G8484 FLU IMMUNIZE NO ADMIN: HCPCS | Performed by: FAMILY MEDICINE

## 2024-11-21 ASSESSMENT — PATIENT HEALTH QUESTIONNAIRE - PHQ9
SUM OF ALL RESPONSES TO PHQ QUESTIONS 1-9: 0
1. LITTLE INTEREST OR PLEASURE IN DOING THINGS: NOT AT ALL
SUM OF ALL RESPONSES TO PHQ9 QUESTIONS 1 & 2: 0
SUM OF ALL RESPONSES TO PHQ QUESTIONS 1-9: 0
2. FEELING DOWN, DEPRESSED OR HOPELESS: NOT AT ALL
SUM OF ALL RESPONSES TO PHQ QUESTIONS 1-9: 0
SUM OF ALL RESPONSES TO PHQ QUESTIONS 1-9: 0

## 2024-11-21 ASSESSMENT — ENCOUNTER SYMPTOMS
SHORTNESS OF BREATH: 0
NAUSEA: 0
DIARRHEA: 0
VOMITING: 0
CONSTIPATION: 0
COUGH: 0

## 2024-11-21 NOTE — PROGRESS NOTES
Ifeoma Montes (:  1995) is a 29 y.o. female,Established patient, here for evaluation of the following chief complaint(s):  Annual Exam      SUBJECTIVE:  24:  Needed annual physical for her job, clearing her for any abnormalities    Iron defic:  Is taking iron every night    Vitamin D defic  - taking vit D weekly, but does not yet feel a difference    Unable to find any titers for hep B or polio, so the immunizations may not be utd.  Pt states she had blood work done at  before she began working there, so will check to get those records before looking to get more titers drawn       10.2.24: new patient    Concern for having 'low immune' system.  Feels like she gets sick easily. States she got the flu immediately from her son, and she also gets colds often. States she does not wear masks even though she works as a [employer], and does not want 'that covid vaccine stuff' as she does not believe she needs it.  She also mentions that she tries to 'get my kids outta my face' when they come home from  or school, and thinks they are the cause of her getting sick. Cannot recall any bacterial infections, only 'a lot of colds'      Has appt tomorrow with [facility], due to having some pain in her knees after sitting cross legged or walking up 'a lot' of steps. Started to notice more of this pain in the last month or so. No trauma, no major changes to her diet or exercise.     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

## 2025-03-26 DIAGNOSIS — E55.9 VITAMIN D DEFICIENCY: ICD-10-CM

## 2025-03-27 RX ORDER — ERGOCALCIFEROL 1.25 MG/1
50000 CAPSULE, LIQUID FILLED ORAL WEEKLY
Qty: 12 CAPSULE | Refills: 1 | Status: SHIPPED | OUTPATIENT
Start: 2025-03-27

## 2025-03-27 NOTE — TELEPHONE ENCOUNTER
Medication:   Requested Prescriptions     Pending Prescriptions Disp Refills    vitamin D (ERGOCALCIFEROL) 1.25 MG (20321 UT) CAPS capsule [Pharmacy Med Name: VIT D2 (ERGOCAL) 1.25MG(50,000U) CP] 12 capsule 1     Sig: TAKE 1 CAPSULE BY MOUTH ONCE WEEKLY       Last Filled:      Patient Phone Number: [phone] (home)     Last appt: 11/21/2024   Next appt: Visit date not found    Last Labs DM:   Lab Results   Component Value Date/Time    VITD25 11.6 10/03/2024 01:05 PM    VITD25 14 06/27/2013 02:13 PM

## 2025-04-07 ENCOUNTER — PATIENT MESSAGE (OUTPATIENT)
Dept: PRIMARY CARE CLINIC | Age: 30
End: 2025-04-07

## 2025-04-09 ENCOUNTER — OFFICE VISIT (OUTPATIENT)
Dept: PRIMARY CARE CLINIC | Age: 30
End: 2025-04-09
Payer: COMMERCIAL

## 2025-04-09 VITALS
WEIGHT: 188.6 LBS | OXYGEN SATURATION: 96 % | HEIGHT: 64 IN | TEMPERATURE: 98.6 F | DIASTOLIC BLOOD PRESSURE: 76 MMHG | SYSTOLIC BLOOD PRESSURE: 114 MMHG | BODY MASS INDEX: 32.2 KG/M2 | HEART RATE: 86 BPM

## 2025-04-09 DIAGNOSIS — E61.1 IRON DEFICIENCY: ICD-10-CM

## 2025-04-09 DIAGNOSIS — Z02.89 ENCOUNTER FOR PHYSICAL EXAMINATION RELATED TO EMPLOYMENT: ICD-10-CM

## 2025-04-09 DIAGNOSIS — E55.9 VITAMIN D DEFICIENCY: Primary | ICD-10-CM

## 2025-04-09 PROCEDURE — 99214 OFFICE O/P EST MOD 30 MIN: CPT | Performed by: FAMILY MEDICINE

## 2025-04-09 PROCEDURE — G8417 CALC BMI ABV UP PARAM F/U: HCPCS | Performed by: FAMILY MEDICINE

## 2025-04-09 PROCEDURE — G8427 DOCREV CUR MEDS BY ELIG CLIN: HCPCS | Performed by: FAMILY MEDICINE

## 2025-04-09 PROCEDURE — 1036F TOBACCO NON-USER: CPT | Performed by: FAMILY MEDICINE

## 2025-04-09 SDOH — ECONOMIC STABILITY: FOOD INSECURITY: WITHIN THE PAST 12 MONTHS, THE FOOD YOU BOUGHT JUST DIDN'T LAST AND YOU DIDN'T HAVE MONEY TO GET MORE.: NEVER TRUE

## 2025-04-09 SDOH — ECONOMIC STABILITY: INCOME INSECURITY: IN THE LAST 12 MONTHS, WAS THERE A TIME WHEN YOU WERE NOT ABLE TO PAY THE MORTGAGE OR RENT ON TIME?: NO

## 2025-04-09 SDOH — ECONOMIC STABILITY: FOOD INSECURITY: WITHIN THE PAST 12 MONTHS, YOU WORRIED THAT YOUR FOOD WOULD RUN OUT BEFORE YOU GOT MONEY TO BUY MORE.: NEVER TRUE

## 2025-04-09 SDOH — ECONOMIC STABILITY: TRANSPORTATION INSECURITY
IN THE PAST 12 MONTHS, HAS THE LACK OF TRANSPORTATION KEPT YOU FROM MEDICAL APPOINTMENTS OR FROM GETTING MEDICATIONS?: NO

## 2025-04-09 SDOH — ECONOMIC STABILITY: TRANSPORTATION INSECURITY
IN THE PAST 12 MONTHS, HAS LACK OF TRANSPORTATION KEPT YOU FROM MEETINGS, WORK, OR FROM GETTING THINGS NEEDED FOR DAILY LIVING?: NO

## 2025-04-09 ASSESSMENT — PATIENT HEALTH QUESTIONNAIRE - PHQ9
SUM OF ALL RESPONSES TO PHQ QUESTIONS 1-9: 0
SUM OF ALL RESPONSES TO PHQ QUESTIONS 1-9: 0
2. FEELING DOWN, DEPRESSED OR HOPELESS: NOT AT ALL
SUM OF ALL RESPONSES TO PHQ QUESTIONS 1-9: 0
2. FEELING DOWN, DEPRESSED OR HOPELESS: NOT AT ALL
1. LITTLE INTEREST OR PLEASURE IN DOING THINGS: NOT AT ALL
SUM OF ALL RESPONSES TO PHQ9 QUESTIONS 1 & 2: 0
SUM OF ALL RESPONSES TO PHQ QUESTIONS 1-9: 0
1. LITTLE INTEREST OR PLEASURE IN DOING THINGS: NOT AT ALL

## 2025-04-09 ASSESSMENT — ENCOUNTER SYMPTOMS
CONSTIPATION: 0
DIARRHEA: 0
COUGH: 0
NAUSEA: 0
VOMITING: 0
SHORTNESS OF BREATH: 0

## 2025-04-09 NOTE — PROGRESS NOTES
Future     Expected Date:   4/9/2025     Expiration Date:   4/9/2026       Return in about 2 months (around 6/9/2025) for school form - 30 min appt .        Dr. Casandra Lockett D.O.  - Family Medicine and [facility]        Usual doctor's hours are:       Monday 7:00 am to 5:30 pm  Wednesday 7:00 am to 4:30 pm  Thursday 7:00 am to 4:30 pm  Friday 7:00 am to 3:30 pm  Saturdays, Sundays, and after hours: E-Visits are available    We observe most federal holidays and Good Friday.    We ask that you only contact the office one time per issue or question, and please allow one full business day for a call back. Calling us back multiple times keeps us from being able to complete the work efficiently for you and our other patients.    For medication renewals, please call your pharmacist to contact us, and be sure to allow at least 3 business days for processing before you need to  your medication.     If you are sick or need an appointment that hasn't been planned, same day appointments are available every day the office is open: Monday, Tuesday, Wednesday, Thursday, and Friday.  Call during office hours to schedule, even if it may not be with your regular physician. You may also call the office after 8 am on office days if you need to be seen from an issue the night before.    During hours when the office is not normally open, your call will go to the messaging service which cannot provide any service other than paging the doctor. No prescriptions or other nonurgent matters will be handled and no voicemail is available, so please call back during office hours for these matters.       Electronically signed by Casandra Lockett DO on 4/9/2025 at 9:13 AM.

## 2025-04-16 ENCOUNTER — PATIENT MESSAGE (OUTPATIENT)
Dept: PRIMARY CARE CLINIC | Age: 30
End: 2025-04-16

## 2025-04-17 NOTE — TELEPHONE ENCOUNTER
Called and spoke with pt informed we are unable to fill out form lab work has not been completed for TB testing. Pt expressed understanding and stated she will get lab work first in the morning

## 2025-04-18 LAB
25(OH)D3 SERPL-MCNC: 14.2 NG/ML
BASOPHILS # BLD: 0 K/UL (ref 0–0.2)
BASOPHILS NFR BLD: 0.3 %
DEPRECATED RDW RBC AUTO: 13.6 % (ref 12.4–15.4)
EOSINOPHIL # BLD: 0 K/UL (ref 0–0.6)
EOSINOPHIL NFR BLD: 1 %
FERRITIN SERPL IA-MCNC: 15.5 NG/ML (ref 15–150)
HBV SURFACE AB SERPL IA-ACNC: 208 MIU/ML
HCT VFR BLD AUTO: 39.6 % (ref 36–48)
HGB BLD-MCNC: 13.4 G/DL (ref 12–16)
IRON SATN MFR SERPL: 15 % (ref 15–50)
IRON SERPL-MCNC: 56 UG/DL (ref 37–145)
LYMPHOCYTES # BLD: 1.4 K/UL (ref 1–5.1)
LYMPHOCYTES NFR BLD: 29.2 %
MCH RBC QN AUTO: 29 PG (ref 26–34)
MCHC RBC AUTO-ENTMCNC: 33.8 G/DL (ref 31–36)
MCV RBC AUTO: 85.7 FL (ref 80–100)
MONOCYTES # BLD: 0.4 K/UL (ref 0–1.3)
MONOCYTES NFR BLD: 8.6 %
NEUTROPHILS # BLD: 2.9 K/UL (ref 1.7–7.7)
NEUTROPHILS NFR BLD: 60.9 %
PLATELET # BLD AUTO: 262 K/UL (ref 135–450)
PMV BLD AUTO: 9.5 FL (ref 5–10.5)
RBC # BLD AUTO: 4.63 M/UL (ref 4–5.2)
REASON FOR REJECTION: NORMAL
REJECTED TEST: NORMAL
TIBC SERPL-MCNC: 367 UG/DL (ref 260–445)
WBC # BLD AUTO: 4.8 K/UL (ref 4–11)

## 2025-04-19 LAB
MEV IGG SER QL IA: NORMAL
MUV IGG SER QL IA: NORMAL
RUBV IGG SERPL QL IA: NORMAL
VZV IGG SER QL IA: NORMAL

## 2025-04-21 ENCOUNTER — TELEPHONE (OUTPATIENT)
Dept: PRIMARY CARE CLINIC | Age: 30
End: 2025-04-21
Payer: COMMERCIAL

## 2025-04-21 DIAGNOSIS — Z02.89 ENCOUNTER FOR PHYSICAL EXAMINATION RELATED TO EMPLOYMENT: Primary | ICD-10-CM

## 2025-04-21 LAB
C DIPHTHERIAE IGG SER-ACNC: 0.6 IU/ML
C TETANI IGG SER IA-ACNC: 1.6 IU/ML

## 2025-04-21 PROCEDURE — 36415 COLL VENOUS BLD VENIPUNCTURE: CPT | Performed by: FAMILY MEDICINE

## 2025-04-21 NOTE — TELEPHONE ENCOUNTER
Shahid calling from [facility] states Quantiferon TB gold specimen was rejected any further questions pls return Shahid call back @ [phone] to discuss

## 2025-04-21 NOTE — TELEPHONE ENCOUNTER
Called and spoke with pt informed new order has been placed and to go to any [facility] for redraw. Pt expressed understanding

## 2025-04-21 NOTE — TELEPHONE ENCOUNTER
Pt also would like to know status of labs if they are ok vitamin d came back abnormal pt is concerned on what to do

## 2025-04-23 LAB
B PERT AB SPEC QL: NEGATIVE
B PERT FHA IGG SER QL: POSITIVE
B PERT IGG SER IA-ACNC: 3.13 IV
B PERT IGG SER QL IB: ABNORMAL
B PERT IGG SER QL: ABNORMAL
B PERT IGM SER IA-ACNC: 0.9 IV

## 2025-04-28 RX ORDER — ERGOCALCIFEROL 1.25 MG/1
50000 CAPSULE, LIQUID FILLED ORAL WEEKLY
Qty: 4 CAPSULE | Refills: 4 | Status: SHIPPED | OUTPATIENT
Start: 2025-04-28

## 2025-04-28 NOTE — TELEPHONE ENCOUNTER
I will put in a Vitamin D supplement for her to start taking again as her level is still low, she would be taking it once per week

## 2025-05-04 LAB
GAMMA INTERFERON BACKGROUND BLD IA-ACNC: 0.08 IU/ML
M TB IFN-G BLD-IMP: NEGATIVE
M TB IFN-G CD4+ BCKGRND COR BLD-ACNC: 0.2 IU/ML
M TB IFN-G CD4+CD8+ BCKGRND COR BLD-ACNC: 0.27 IU/ML
MITOGEN IGNF BCKGRD COR BLD-ACNC: 9.92 IU/ML